# Patient Record
Sex: FEMALE | Race: WHITE | Employment: OTHER | URBAN - METROPOLITAN AREA
[De-identification: names, ages, dates, MRNs, and addresses within clinical notes are randomized per-mention and may not be internally consistent; named-entity substitution may affect disease eponyms.]

---

## 2020-01-24 ENCOUNTER — HOSPITAL ENCOUNTER (EMERGENCY)
Facility: CLINIC | Age: 80
Discharge: ANOTHER HOSPITAL | End: 2020-01-25
Attending: EMERGENCY MEDICINE
Payer: COMMERCIAL

## 2020-01-24 ENCOUNTER — APPOINTMENT (OUTPATIENT)
Dept: CT IMAGING | Facility: CLINIC | Age: 80
End: 2020-01-24
Payer: COMMERCIAL

## 2020-01-24 VITALS
OXYGEN SATURATION: 98 % | DIASTOLIC BLOOD PRESSURE: 68 MMHG | HEART RATE: 109 BPM | RESPIRATION RATE: 15 BRPM | HEIGHT: 61 IN | SYSTOLIC BLOOD PRESSURE: 147 MMHG | TEMPERATURE: 97.9 F | BODY MASS INDEX: 24.21 KG/M2 | WEIGHT: 128.25 LBS

## 2020-01-24 LAB
-: ABNORMAL
ABSOLUTE EOS #: 0 K/UL (ref 0–0.4)
ABSOLUTE IMMATURE GRANULOCYTE: ABNORMAL K/UL (ref 0–0.3)
ABSOLUTE LYMPH #: 1.91 K/UL (ref 1–4.8)
ABSOLUTE MONO #: 0.38 K/UL (ref 0.1–0.8)
AMORPHOUS: ABNORMAL
ANION GAP SERPL CALCULATED.3IONS-SCNC: 14 MMOL/L (ref 9–17)
BACTERIA: ABNORMAL
BASOPHILS # BLD: 0 % (ref 0–2)
BASOPHILS ABSOLUTE: 0 K/UL (ref 0–0.2)
BILIRUBIN URINE: NEGATIVE
BUN BLDV-MCNC: 12 MG/DL (ref 8–23)
BUN/CREAT BLD: ABNORMAL (ref 9–20)
CALCIUM SERPL-MCNC: 9.5 MG/DL (ref 8.6–10.4)
CASTS UA: ABNORMAL /LPF (ref 0–2)
CHLORIDE BLD-SCNC: 94 MMOL/L (ref 98–107)
CO2: 25 MMOL/L (ref 20–31)
COLOR: ABNORMAL
COMMENT UA: ABNORMAL
CREAT SERPL-MCNC: 0.7 MG/DL (ref 0.5–0.9)
CRYSTALS, UA: ABNORMAL /HPF
DIFFERENTIAL TYPE: ABNORMAL
EOSINOPHILS RELATIVE PERCENT: 0 % (ref 1–4)
EPITHELIAL CELLS UA: ABNORMAL /HPF (ref 0–5)
GFR AFRICAN AMERICAN: >60 ML/MIN
GFR NON-AFRICAN AMERICAN: >60 ML/MIN
GFR SERPL CREATININE-BSD FRML MDRD: ABNORMAL ML/MIN/{1.73_M2}
GFR SERPL CREATININE-BSD FRML MDRD: ABNORMAL ML/MIN/{1.73_M2}
GLUCOSE BLD-MCNC: 176 MG/DL (ref 70–99)
GLUCOSE URINE: NEGATIVE
HCT VFR BLD CALC: 33.6 % (ref 36–46)
HEMOGLOBIN: 10.9 G/DL (ref 12–16)
IMMATURE GRANULOCYTES: ABNORMAL %
KETONES, URINE: ABNORMAL
LACTIC ACID: 1.8 MMOL/L (ref 0.5–2.2)
LEUKOCYTE ESTERASE, URINE: ABNORMAL
LIPASE: 7 U/L (ref 13–60)
LYMPHOCYTES # BLD: 10 % (ref 24–44)
MCH RBC QN AUTO: 29.9 PG (ref 26–34)
MCHC RBC AUTO-ENTMCNC: 32.5 G/DL (ref 31–37)
MCV RBC AUTO: 91.9 FL (ref 80–100)
MONOCYTES # BLD: 2 % (ref 1–7)
MORPHOLOGY: NORMAL
MUCUS: ABNORMAL
NITRITE, URINE: NEGATIVE
NRBC AUTOMATED: ABNORMAL PER 100 WBC
OTHER OBSERVATIONS UA: ABNORMAL
PDW BLD-RTO: 13.9 % (ref 12.5–15.4)
PH UA: 7 (ref 5–8)
PLATELET # BLD: 272 K/UL (ref 140–450)
PLATELET ESTIMATE: ABNORMAL
PMV BLD AUTO: 6.3 FL (ref 6–12)
POTASSIUM SERPL-SCNC: 3.9 MMOL/L (ref 3.7–5.3)
PROTEIN UA: NEGATIVE
RBC # BLD: 3.66 M/UL (ref 4–5.2)
RBC # BLD: ABNORMAL 10*6/UL
RBC UA: ABNORMAL /HPF (ref 0–2)
RENAL EPITHELIAL, UA: ABNORMAL /HPF
SEG NEUTROPHILS: 88 % (ref 36–66)
SEGMENTED NEUTROPHILS ABSOLUTE COUNT: 16.81 K/UL (ref 1.8–7.7)
SODIUM BLD-SCNC: 133 MMOL/L (ref 135–144)
SPECIFIC GRAVITY UA: 1.01 (ref 1–1.03)
TRICHOMONAS: ABNORMAL
TURBIDITY: CLEAR
URINE HGB: ABNORMAL
UROBILINOGEN, URINE: NORMAL
WBC # BLD: 19.1 K/UL (ref 3.5–11)
WBC # BLD: ABNORMAL 10*3/UL
WBC UA: ABNORMAL /HPF (ref 0–5)
YEAST: ABNORMAL

## 2020-01-24 PROCEDURE — 83690 ASSAY OF LIPASE: CPT

## 2020-01-24 PROCEDURE — 80048 BASIC METABOLIC PNL TOTAL CA: CPT

## 2020-01-24 PROCEDURE — 96375 TX/PRO/DX INJ NEW DRUG ADDON: CPT

## 2020-01-24 PROCEDURE — 96374 THER/PROPH/DIAG INJ IV PUSH: CPT

## 2020-01-24 PROCEDURE — 87040 BLOOD CULTURE FOR BACTERIA: CPT

## 2020-01-24 PROCEDURE — 85025 COMPLETE CBC W/AUTO DIFF WBC: CPT

## 2020-01-24 PROCEDURE — 81001 URINALYSIS AUTO W/SCOPE: CPT

## 2020-01-24 PROCEDURE — 2580000003 HC RX 258: Performed by: EMERGENCY MEDICINE

## 2020-01-24 PROCEDURE — 36415 COLL VENOUS BLD VENIPUNCTURE: CPT

## 2020-01-24 PROCEDURE — 83605 ASSAY OF LACTIC ACID: CPT

## 2020-01-24 PROCEDURE — 99285 EMERGENCY DEPT VISIT HI MDM: CPT

## 2020-01-24 PROCEDURE — 74176 CT ABD & PELVIS W/O CONTRAST: CPT

## 2020-01-24 PROCEDURE — 87086 URINE CULTURE/COLONY COUNT: CPT

## 2020-01-24 PROCEDURE — 6360000002 HC RX W HCPCS: Performed by: EMERGENCY MEDICINE

## 2020-01-24 RX ORDER — 0.9 % SODIUM CHLORIDE 0.9 %
500 INTRAVENOUS SOLUTION INTRAVENOUS ONCE
Status: COMPLETED | OUTPATIENT
Start: 2020-01-24 | End: 2020-01-24

## 2020-01-24 RX ORDER — MORPHINE SULFATE 2 MG/ML
2 INJECTION, SOLUTION INTRAMUSCULAR; INTRAVENOUS ONCE
Status: COMPLETED | OUTPATIENT
Start: 2020-01-24 | End: 2020-01-24

## 2020-01-24 RX ADMIN — SODIUM CHLORIDE 500 ML: 9 INJECTION, SOLUTION INTRAVENOUS at 21:21

## 2020-01-24 RX ADMIN — CEFTRIAXONE SODIUM 1 G: 1 INJECTION, POWDER, FOR SOLUTION INTRAMUSCULAR; INTRAVENOUS at 22:00

## 2020-01-24 RX ADMIN — MORPHINE SULFATE 2 MG: 2 INJECTION, SOLUTION INTRAMUSCULAR; INTRAVENOUS at 21:24

## 2020-01-24 SDOH — HEALTH STABILITY: MENTAL HEALTH: HOW OFTEN DO YOU HAVE A DRINK CONTAINING ALCOHOL?: NEVER

## 2020-01-24 ASSESSMENT — PAIN DESCRIPTION - DESCRIPTORS
DESCRIPTORS: SHARP
DESCRIPTORS: SHARP

## 2020-01-24 ASSESSMENT — PAIN DESCRIPTION - LOCATION
LOCATION: ABDOMEN
LOCATION: ABDOMEN

## 2020-01-24 ASSESSMENT — PAIN DESCRIPTION - FREQUENCY
FREQUENCY: CONTINUOUS
FREQUENCY: CONTINUOUS

## 2020-01-24 ASSESSMENT — PAIN SCALES - GENERAL
PAINLEVEL_OUTOF10: 10
PAINLEVEL_OUTOF10: 8

## 2020-01-24 ASSESSMENT — PAIN DESCRIPTION - ORIENTATION
ORIENTATION: RIGHT;LEFT;LOWER;UPPER
ORIENTATION: RIGHT;LEFT;UPPER;LOWER

## 2020-01-24 ASSESSMENT — PAIN DESCRIPTION - PAIN TYPE
TYPE: ACUTE PAIN
TYPE: ACUTE PAIN

## 2020-01-25 NOTE — ED PROVIDER NOTES
MICROSCOPIC URINALYSIS - Abnormal; Notable for the following components:    Bacteria, UA FEW (*)     Mucus, UA 1+ (*)     Other Observations UA Culture ordered based on defined criteria. (*)     All other components within normal limits   URINE CULTURE CLEAN CATCH   CULTURE BLOOD #1   LACTIC ACID         EMERGENCY DEPARTMENT COURSE:   Vitals:    Vitals:    01/24/20 2025 01/24/20 2200   BP: (!) 143/69 101/75   Pulse: 121 100   Resp: 15 15   Temp: 97.9 °F (36.6 °C)    TempSrc: Oral    SpO2: 95% 95%   Weight: 58.2 kg (128 lb 4 oz)    Height: 5' 1\" (1.549 m)      -------------------------  BP: 101/75, Temp: 97.9 °F (36.6 °C), Pulse: 100, Resp: 15    Orders Placed This Encounter   Medications    0.9 % sodium chloride bolus    morphine (PF) injection 2 mg    cefTRIAXone (ROCEPHIN) 1 g in sterile water 10 mL IV syringe           Re-evaluation Notes    Labs came back with elevated white count and 19,000. Lactic acid is normal.  CAT scan per radiologist, shows nothing acute. Urine comes back with signs of urinary tract infection with leukocyte esterase, white cells. Patient had cultures taken. This time based on her findings. I feel that she needs to be admitted. She is requesting to go to [facility] as that is where she always goes and for insurance reasons. I did speak with hospitalist on-call,  was agreeable to admit        FINAL IMPRESSION      1. Urinary tract infection without hematuria, site unspecified    2. Bacteremia          DISPOSITION/PLAN   DISPOSITION Decision To Transfer 01/24/2020 10:33:20 PM      Condition on Disposition    Stable    PATIENT REFERRED TO:  No follow-up provider specified.     DISCHARGE MEDICATIONS:  New Prescriptions    No medications on file       (Please note that portions of this note were completed with a voice recognition program.  Efforts were made to edit the dictations but occasionally words are mis-transcribed.)    Roa MD, ÁNGEL MULLINS   Attending Emergency Physician        Lou Dacosta MD  01/24/20 7374

## 2020-01-25 NOTE — ED NOTES
[facility] notified of potential admission to [address] [facility]. [address], RN  01/24/[facility], [facility]  01/24/20 5805

## 2020-01-25 NOTE — ED NOTES
Pt presents to the ED via private auto accompanied by her son. Son states pt is normally alert and oriented and independent. Pt is now somewhat slower than normal and easily distracted. Pt states she developed abdominal pain this am, nausea, and discomfort when she urinates.  Pt states her bowel movements have been normal.      Asad Abreu RN  01/24/20 2053

## 2020-01-25 NOTE — ED NOTES
Dr. Jamaica Prescott returned call  For admission to [address] [place] [address]  01/24/20 4915

## 2020-01-26 LAB
CULTURE: NORMAL
Lab: NORMAL
SPECIMEN DESCRIPTION: NORMAL

## 2020-01-31 LAB
CULTURE: NORMAL
Lab: NORMAL
SPECIMEN DESCRIPTION: NORMAL

## 2021-09-06 ENCOUNTER — APPOINTMENT (OUTPATIENT)
Dept: GENERAL RADIOLOGY | Age: 81
End: 2021-09-06
Payer: COMMERCIAL

## 2021-09-06 ENCOUNTER — HOSPITAL ENCOUNTER (EMERGENCY)
Age: 81
Discharge: HOME | End: 2021-09-06
Attending: EMERGENCY MEDICINE
Payer: COMMERCIAL

## 2021-09-06 VITALS
RESPIRATION RATE: 20 BRPM | BODY MASS INDEX: 22.84 KG/M2 | TEMPERATURE: 98.3 F | WEIGHT: 121 LBS | SYSTOLIC BLOOD PRESSURE: 189 MMHG | HEART RATE: 77 BPM | DIASTOLIC BLOOD PRESSURE: 68 MMHG | HEIGHT: 61 IN | OXYGEN SATURATION: 94 %

## 2021-09-06 DIAGNOSIS — S32.010A COMPRESSION FRACTURE OF L1 VERTEBRA, INITIAL ENCOUNTER (HCC): Primary | ICD-10-CM

## 2021-09-06 PROCEDURE — 73521 X-RAY EXAM HIPS BI 2 VIEWS: CPT

## 2021-09-06 PROCEDURE — 6370000000 HC RX 637 (ALT 250 FOR IP): Performed by: NURSE PRACTITIONER

## 2021-09-06 PROCEDURE — 99283 EMERGENCY DEPT VISIT LOW MDM: CPT

## 2021-09-06 PROCEDURE — 72072 X-RAY EXAM THORAC SPINE 3VWS: CPT

## 2021-09-06 PROCEDURE — 72100 X-RAY EXAM L-S SPINE 2/3 VWS: CPT

## 2021-09-06 RX ORDER — HYDROCODONE BITARTRATE AND ACETAMINOPHEN 5; 325 MG/1; MG/1
1 TABLET ORAL EVERY 6 HOURS PRN
Qty: 20 TABLET | Refills: 0 | Status: SHIPPED | OUTPATIENT
Start: 2021-09-06 | End: 2021-09-11

## 2021-09-06 RX ORDER — HYDROCODONE BITARTRATE AND ACETAMINOPHEN 5; 325 MG/1; MG/1
1 TABLET ORAL ONCE
Status: COMPLETED | OUTPATIENT
Start: 2021-09-06 | End: 2021-09-06

## 2021-09-06 RX ADMIN — HYDROCODONE BITARTRATE AND ACETAMINOPHEN 1 TABLET: 5; 325 TABLET ORAL at 18:48

## 2021-09-06 ASSESSMENT — PAIN SCALES - GENERAL
PAINLEVEL_OUTOF10: 10
PAINLEVEL_OUTOF10: 10

## 2021-09-06 ASSESSMENT — ENCOUNTER SYMPTOMS
SORE THROAT: 0
COLOR CHANGE: 0
CONSTIPATION: 0
NAUSEA: 0
ABDOMINAL PAIN: 0
COUGH: 0
DIARRHEA: 0
RHINORRHEA: 0
SINUS PRESSURE: 0
WHEEZING: 0
SHORTNESS OF BREATH: 0
VOMITING: 0

## 2021-09-06 ASSESSMENT — PAIN DESCRIPTION - LOCATION: LOCATION: BACK;HIP

## 2021-09-06 ASSESSMENT — PAIN DESCRIPTION - PAIN TYPE: TYPE: ACUTE PAIN

## 2021-09-06 ASSESSMENT — PAIN DESCRIPTION - ORIENTATION: ORIENTATION: LEFT;RIGHT

## 2021-09-06 NOTE — ED PROVIDER NOTES
[facility] ED  eMERGENCY dEPARTMENT eNCOUnter      Pt Name: William Colunga  MRN: [id number]  [place]al[place] antonio 1940  Date of evaluation: 9/6/2021  Provider: Rossana Pena NP, AMBROCIO - Ary 3772       Chief Complaint   Patient presents with    Fall     Pt reports fall yesterday, states that she was playing with her dog when she lost her footing and fell onto her buttocks. Pt reports pain in her back and bilateral hips today    Hip Pain    Back Pain         HISTORY OF PRESENT ILLNESS  (Location/Symptom, Timing/Onset, Context/Setting, Quality, Duration, Modifying Factors, Severity.)   William Colunga is a 78 y.o. female who presents to the emergency department by private vehicle for evaluation of bilateral hip and low back pain. Patient reports that she had a mechanical trip and fall. She states that she fell landing on her buttocks and back. She is a fall happened today. She has been having pain to both of her hips and her mid and low back. The pain is a 10 on a 0-to-10 scale. The pain is worse with movement. Denies bowel or bladder incontinence retention or saddle anesthesia. She denies any presyncopal symptoms prior to falling      Nursing Notes were reviewed.     ALLERGIES     Ambien [zolpidem] and Bactrim [sulfamethoxazole-trimethoprim]    CURRENT MEDICATIONS       Discharge Medication List as of 9/6/2021  7:12 PM      CONTINUE these medications which have NOT CHANGED    Details   metoprolol tartrate (LOPRESSOR) 25 MG tablet Take 25 mg by mouth dailyHistorical Med      metFORMIN (GLUCOPHAGE) 500 MG tablet Take 500 mg by mouth 2 times daily (with meals)Historical Med      mirabegron (MYRBETRIQ) 25 MG TB24 Take 25 mg by mouth dailyHistorical Med             PAST MEDICAL HISTORY         Diagnosis Date    Diabetes mellitus ([address]     Hypertension     Tachycardia        SURGICAL HISTORY           Procedure Laterality Date    ABDOMEN SURGERY      BREAST SURGERY      HYSTERECTOMY           FAMILY HISTORY     History reviewed. No pertinent family history. No family status information on file. SOCIAL HISTORY      reports that she has never smoked. She has never used smokeless tobacco. She reports that she does not drink alcohol and does not use drugs. REVIEW OF SYSTEMS    (2-9 systems for level 4, 10 or more for level 5)     Review of Systems   Constitutional: Negative for chills, fever and unexpected weight change. HENT: Negative for congestion, rhinorrhea, sinus pressure and sore throat. Respiratory: Negative for cough, shortness of breath and wheezing. Cardiovascular: Negative for chest pain and palpitations. Gastrointestinal: Negative for abdominal pain, constipation, diarrhea, nausea and vomiting. Genitourinary: Negative for dysuria and hematuria. Musculoskeletal: Negative for arthralgias and myalgias. Skin: Negative for color change and rash. Neurological: Negative for dizziness, weakness and headaches. Hematological: Negative for adenopathy. All other systems reviewed and are negative. Except as noted above the remainder of the review of systems was reviewed and negative. PHYSICAL EXAM    (up to 7 for level 4, 8 or more for level 5)     ED Triage Vitals [09/06/21 1631]   BP Temp Temp Source Pulse Resp SpO2 Height Weight   (!) 189/68 98.3 °F (36.8 °C) Oral 77 20 94 % 5' 1\" (1.549 m) 121 lb (54.9 kg)       Physical Exam  Vitals reviewed. Constitutional:       Appearance: She is well-developed. HENT:      Head: Normocephalic and atraumatic. Eyes:      Conjunctiva/sclera: Conjunctivae normal.      Pupils: Pupils are equal, round, and reactive to light. Cardiovascular:      Rate and Rhythm: Normal rate and regular rhythm. Pulmonary:      Effort: Pulmonary effort is normal. No respiratory distress. Breath sounds: Normal breath sounds. No stridor. Abdominal:      General: Bowel sounds are normal.      Palpations: Abdomen is soft.    Musculoskeletal: VIEWS)    Result Date: 9/6/2021  EXAMINATION: THREE XRAY VIEWS OF THE THORACIC SPINE; THREE XRAY VIEWS OF THE LUMBAR SPINE 9/6/2021 2:19 pm COMPARISON: None. HISTORY: ORDERING SYSTEM PROVIDED HISTORY: Pain TECHNOLOGIST PROVIDED HISTORY: Pain Reason for Exam: back pain Acuity: Acute Type of Exam: Initial Mechanism of Injury: fell; ORDERING SYSTEM PROVIDED HISTORY: back pain TECHNOLOGIST PROVIDED HISTORY: back pain FINDINGS: No subluxations are seen. Compression fracture of L1 vertebral body with 75% loss of normal height. No significant retropulsion into the spinal canal. Mild superior endplate depression of L4 vertebral body. There is disc space narrowing with eburnation of the vertebral endplates in the midthoracic spine and also at the L1-2, L2-3, L3-4, L4-5 levels. There is sclerosis of the facet joints in the mid and lower lumbar spine. The posterior elements are otherwise intact. The paraspinal soft tissues are unremarkable except for atherosclerotic changes. Postsurgical changes overlying the mediastinum. Left pleural effusion and possible left basal infiltrate. Compression fracture of L1 vertebral body with 75% loss of normal height. Mild superior endplate depression of L4 vertebral body. Multilevel spondylosis and degenerative disc disease. Facet arthropathy Left pleural effusion and possible left basal infiltrate. XR HIP BILATERAL W AP PELVIS (2 VIEWS)    Result Date: 9/6/2021  EXAMINATION: ONE XRAY VIEW OF THE PELVIS AND TWO XRAY VIEWS OF EACH OF THE BILATERAL HIPS 9/6/2021 2:19 pm COMPARISON: None. HISTORY: ORDERING SYSTEM PROVIDED HISTORY: fall TECHNOLOGIST PROVIDED HISTORY: fall Reason for Exam: bilateral hip pain Acuity: Acute Type of Exam: Initial Mechanism of Injury: fell FINDINGS: The visualized bones are osteopenic. There is no evidence of fracture or dislocation. Mild to moderate degenerative changes of the bilateral hips. The remaining joint spaces appear well maintained.  The soft tissues are unremarkable. Vascular calcifications are seen compatible with atherosclerotic disease. No acute bony abnormalities are noted Osteoarthritis of the bilateral hips     Interpretation per the Radiologist below, if available at the time of this note:    XR LUMBAR SPINE (2-3 VIEWS)   Final Result   Compression fracture of L1 vertebral body with 75% loss of normal height. Mild superior endplate depression of L4 vertebral body. Multilevel spondylosis and degenerative disc disease. Facet arthropathy      Left pleural effusion and possible left basal infiltrate. XR HIP BILATERAL W AP PELVIS (2 VIEWS)   Final Result   No acute bony abnormalities are noted      Osteoarthritis of the bilateral hips         XR THORACIC SPINE (3 VIEWS)   Final Result   Compression fracture of L1 vertebral body with 75% loss of normal height. Mild superior endplate depression of L4 vertebral body. Multilevel spondylosis and degenerative disc disease. Facet arthropathy      Left pleural effusion and possible left basal infiltrate. LABS:  Labs Reviewed - No data to display    All other labs were within normal range or not returned as of this dictation. EMERGENCY DEPARTMENT COURSE and DIFFERENTIAL DIAGNOSIS/MDM:   Vitals:    Vitals:    09/06/21 1631   BP: (!) 189/68   Pulse: 77   Resp: 20   Temp: 98.3 °F (36.8 °C)   TempSrc: Oral   SpO2: 94%   Weight: 121 lb (54.9 kg)   Height: 5' 1\" (1.549 m)       Medical Decision Making: Patient is slow to ambulate but she is able to get around. She lives at home with her daughter. Patient was placed on Norco for pain medication and given to different spine specialist for follow-up regarding this compression fracture. FINAL IMPRESSION      1.  Compression fracture of L1 vertebra, initial encounter [facility])          DISPOSITION/PLAN   DISPOSITION Decision To Discharge 09/06/2021 07:09:52 PM      PATIENT REFERRED TO:   Mariajose Luis MD  [address] [place]    Schedule an appointment as soon as possible for a visit       Marge Sheldon MD  [address] [place] [address]  [phone]    Schedule an appointment as soon as possible for a visit         DISCHARGE MEDICATIONS:     Discharge Medication List as of 9/6/2021  7:12 PM      START taking these medications    Details   HYDROcodone-acetaminophen (NORCO) 5-325 MG per tablet Take 1 tablet by mouth every 6 hours as needed for Pain for up to 5 days. , Disp-20 tablet, R-0Print                 (Please note that portions of this note were completed with a voice recognition program.  Efforts were made to edit the dictations but occasionally words are mis-transcribed.)    Mallory Blue NP, APRN - CNP  Certified Nurse Practitioner          AMBROCIO Gentile - BECKY  09/06/21 1933

## 2021-09-06 NOTE — ED PROVIDER NOTES
eMERGENCY dEPARTMENT eNCOUnter   [address] [place] Name: Masoud Ariza  MRN: [id number]  [place] 1940  Date of evaluation: 9/6/21     Masoud Ariza is a 78 y.o. female with CC: Fall (Pt reports fall yesterday, states that she was playing with her dog when she lost her footing and fell onto her buttocks. Pt reports pain in her back and bilateral hips today), Hip Pain, and Back Pain      Based on the medical record the care appears appropriate. I was personally available for consultation in the Emergency Department.     Ino Botello DO  Attending Emergency Physician                    Lisa Hand [id number],   09/06/21 2609

## 2021-09-19 ENCOUNTER — HOSPITAL ENCOUNTER (INPATIENT)
Age: 81
LOS: 3 days | Discharge: HOME | DRG: 981 | End: 2021-09-23
Attending: STUDENT IN AN ORGANIZED HEALTH CARE EDUCATION/TRAINING PROGRAM | Admitting: FAMILY MEDICINE
Payer: COMMERCIAL

## 2021-09-19 ENCOUNTER — APPOINTMENT (OUTPATIENT)
Dept: GENERAL RADIOLOGY | Age: 81
DRG: 981 | End: 2021-09-19
Payer: COMMERCIAL

## 2021-09-19 DIAGNOSIS — J90 PLEURAL EFFUSION: ICD-10-CM

## 2021-09-19 DIAGNOSIS — M54.9 INTRACTABLE BACK PAIN: ICD-10-CM

## 2021-09-19 DIAGNOSIS — M51.36 DDD (DEGENERATIVE DISC DISEASE), LUMBAR: ICD-10-CM

## 2021-09-19 DIAGNOSIS — I50.20: Primary | ICD-10-CM

## 2021-09-19 DIAGNOSIS — N17.9 AKI (ACUTE KIDNEY INJURY) (HCC): ICD-10-CM

## 2021-09-19 LAB
-: ABNORMAL
-: NORMAL
ABSOLUTE EOS #: 0.26 K/UL (ref 0–0.44)
ABSOLUTE IMMATURE GRANULOCYTE: 0.03 K/UL (ref 0–0.3)
ABSOLUTE LYMPH #: 0.96 K/UL (ref 1.1–3.7)
ABSOLUTE MONO #: 0.69 K/UL (ref 0.1–1.2)
AMORPHOUS: ABNORMAL
ANION GAP SERPL CALCULATED.3IONS-SCNC: 15 MMOL/L (ref 9–17)
BACTERIA: ABNORMAL
BASOPHILS # BLD: 1 % (ref 0–2)
BASOPHILS ABSOLUTE: 0.05 K/UL (ref 0–0.2)
BILIRUBIN URINE: NEGATIVE
BNP INTERPRETATION: ABNORMAL
BUN BLDV-MCNC: 30 MG/DL (ref 8–23)
BUN/CREAT BLD: 19 (ref 9–20)
CALCIUM SERPL-MCNC: 9.4 MG/DL (ref 8.6–10.4)
CASTS UA: ABNORMAL /LPF
CASTS UA: ABNORMAL /LPF
CHLORIDE BLD-SCNC: 91 MMOL/L (ref 98–107)
CO2: 28 MMOL/L (ref 20–31)
COLOR: YELLOW
COMMENT UA: ABNORMAL
CREAT SERPL-MCNC: 1.54 MG/DL (ref 0.5–0.9)
CRYSTALS, UA: ABNORMAL /HPF
D-DIMER QUANTITATIVE: 2.41 MG/L FEU (ref 0–0.59)
DIFFERENTIAL TYPE: ABNORMAL
EOSINOPHILS RELATIVE PERCENT: 4 % (ref 1–4)
EPITHELIAL CELLS UA: ABNORMAL /HPF (ref 0–5)
GFR AFRICAN AMERICAN: 39 ML/MIN
GFR NON-AFRICAN AMERICAN: 32 ML/MIN
GFR SERPL CREATININE-BSD FRML MDRD: ABNORMAL ML/MIN/{1.73_M2}
GFR SERPL CREATININE-BSD FRML MDRD: ABNORMAL ML/MIN/{1.73_M2}
GLUCOSE BLD-MCNC: 170 MG/DL (ref 70–99)
GLUCOSE URINE: NEGATIVE
HCT VFR BLD CALC: 27.5 % (ref 36.3–47.1)
HEMOGLOBIN: 8.9 G/DL (ref 11.9–15.1)
IMMATURE GRANULOCYTES: 0 %
KETONES, URINE: NEGATIVE
LEUKOCYTE ESTERASE, URINE: ABNORMAL
LYMPHOCYTES # BLD: 13 % (ref 24–43)
MAGNESIUM: 1.6 MG/DL (ref 1.6–2.6)
MCH RBC QN AUTO: 30.4 PG (ref 25.2–33.5)
MCHC RBC AUTO-ENTMCNC: 32.4 G/DL (ref 28.4–34.8)
MCV RBC AUTO: 93.9 FL (ref 82.6–102.9)
MONOCYTES # BLD: 9 % (ref 3–12)
MUCUS: ABNORMAL
NITRITE, URINE: NEGATIVE
NRBC AUTOMATED: 0 PER 100 WBC
OTHER OBSERVATIONS UA: ABNORMAL
PDW BLD-RTO: 13 % (ref 11.8–14.4)
PH UA: 7 (ref 5–8)
PLATELET # BLD: 292 K/UL (ref 138–453)
PLATELET ESTIMATE: ABNORMAL
PMV BLD AUTO: 8.5 FL (ref 8.1–13.5)
POTASSIUM SERPL-SCNC: 4.4 MMOL/L (ref 3.7–5.3)
PRO-BNP: 2037 PG/ML
PROTEIN UA: NEGATIVE
RBC # BLD: 2.93 M/UL (ref 3.95–5.11)
RBC # BLD: ABNORMAL 10*6/UL
RBC UA: ABNORMAL /HPF (ref 0–2)
REASON FOR REJECTION: NORMAL
RENAL EPITHELIAL, UA: ABNORMAL /HPF
SEG NEUTROPHILS: 73 % (ref 36–65)
SEGMENTED NEUTROPHILS ABSOLUTE COUNT: 5.51 K/UL (ref 1.5–8.1)
SODIUM BLD-SCNC: 134 MMOL/L (ref 135–144)
SPECIFIC GRAVITY UA: 1.01 (ref 1–1.03)
TRICHOMONAS: ABNORMAL
TROPONIN INTERP: ABNORMAL
TROPONIN T: ABNORMAL NG/ML
TROPONIN, HIGH SENSITIVITY: 19 NG/L (ref 0–14)
TURBIDITY: ABNORMAL
URINE HGB: ABNORMAL
UROBILINOGEN, URINE: NORMAL
WBC # BLD: 7.5 K/UL (ref 3.5–11.3)
WBC # BLD: ABNORMAL 10*3/UL
WBC UA: ABNORMAL /HPF (ref 0–5)
YEAST: ABNORMAL
ZZ NTE CLEAN UP: ORDERED TEST: NORMAL
ZZ NTE WITH NAME CLEAN UP: SPECIMEN SOURCE: NORMAL

## 2021-09-19 PROCEDURE — 85610 PROTHROMBIN TIME: CPT

## 2021-09-19 PROCEDURE — 83880 ASSAY OF NATRIURETIC PEPTIDE: CPT

## 2021-09-19 PROCEDURE — 81001 URINALYSIS AUTO W/SCOPE: CPT

## 2021-09-19 PROCEDURE — 87086 URINE CULTURE/COLONY COUNT: CPT

## 2021-09-19 PROCEDURE — 85379 FIBRIN DEGRADATION QUANT: CPT

## 2021-09-19 PROCEDURE — 84484 ASSAY OF TROPONIN QUANT: CPT

## 2021-09-19 PROCEDURE — 99285 EMERGENCY DEPT VISIT HI MDM: CPT

## 2021-09-19 PROCEDURE — 83735 ASSAY OF MAGNESIUM: CPT

## 2021-09-19 PROCEDURE — 85025 COMPLETE CBC W/AUTO DIFF WBC: CPT

## 2021-09-19 PROCEDURE — 36415 COLL VENOUS BLD VENIPUNCTURE: CPT

## 2021-09-19 PROCEDURE — 71045 X-RAY EXAM CHEST 1 VIEW: CPT

## 2021-09-19 PROCEDURE — 80048 BASIC METABOLIC PNL TOTAL CA: CPT

## 2021-09-19 PROCEDURE — 93005 ELECTROCARDIOGRAM TRACING: CPT | Performed by: STUDENT IN AN ORGANIZED HEALTH CARE EDUCATION/TRAINING PROGRAM

## 2021-09-19 RX ORDER — POTASSIUM CHLORIDE 20 MEQ/1
20 TABLET, EXTENDED RELEASE ORAL DAILY
COMMUNITY

## 2021-09-19 RX ORDER — AMIODARONE HYDROCHLORIDE 200 MG/1
200 TABLET ORAL DAILY
COMMUNITY
Start: 2021-08-29

## 2021-09-19 RX ORDER — WARFARIN SODIUM 2 MG/1
2 TABLET ORAL DAILY
COMMUNITY
Start: 2021-09-16

## 2021-09-19 RX ORDER — FERROUS SULFATE 325(65) MG
325 TABLET ORAL
COMMUNITY
Start: 2021-08-17

## 2021-09-19 RX ORDER — LEVOTHYROXINE SODIUM 0.03 MG/1
25 TABLET ORAL DAILY
COMMUNITY

## 2021-09-19 RX ORDER — ASPIRIN 81 MG/1
81 TABLET ORAL DAILY
COMMUNITY
Start: 2021-08-17

## 2021-09-19 RX ORDER — TIMOLOL MALEATE 5 MG/ML
1 SOLUTION/ DROPS OPHTHALMIC 2 TIMES DAILY
COMMUNITY

## 2021-09-19 RX ORDER — TORSEMIDE 20 MG/1
TABLET ORAL
Status: ON HOLD | COMMUNITY
Start: 2021-07-24 | End: 2021-09-23 | Stop reason: HOSPADM

## 2021-09-19 RX ORDER — CARVEDILOL 6.25 MG/1
6.25 TABLET ORAL 2 TIMES DAILY WITH MEALS
COMMUNITY
Start: 2021-07-29

## 2021-09-19 ASSESSMENT — PAIN SCALES - GENERAL: PAINLEVEL_OUTOF10: 10

## 2021-09-20 PROBLEM — M80.88XA PATHOLOGICAL FRACTURE OF LUMBAR VERTEBRA DUE TO SECONDARY OSTEOPOROSIS (HCC): Status: ACTIVE | Noted: 2021-09-20

## 2021-09-20 PROBLEM — M51.36 DDD (DEGENERATIVE DISC DISEASE), LUMBAR: Status: ACTIVE | Noted: 2021-09-20

## 2021-09-20 PROBLEM — I50.33 ACUTE ON CHRONIC DIASTOLIC CHF (CONGESTIVE HEART FAILURE) (HCC): Status: ACTIVE | Noted: 2021-09-20

## 2021-09-20 PROBLEM — M80.00XA AGE-RELATED OSTEOPOROSIS WITH CURRENT PATHOLOGICAL FRACTURE: Status: ACTIVE | Noted: 2021-09-20

## 2021-09-20 PROBLEM — M16.0 BILATERAL PRIMARY OSTEOARTHRITIS OF HIP: Status: ACTIVE | Noted: 2021-09-20

## 2021-09-20 PROBLEM — M54.9 INTRACTABLE BACK PAIN: Status: ACTIVE | Noted: 2021-09-20

## 2021-09-20 LAB
ABSOLUTE EOS #: 0.27 K/UL (ref 0–0.44)
ABSOLUTE IMMATURE GRANULOCYTE: 0.03 K/UL (ref 0–0.3)
ABSOLUTE LYMPH #: 1.16 K/UL (ref 1.1–3.7)
ABSOLUTE MONO #: 0.68 K/UL (ref 0.1–1.2)
ANION GAP SERPL CALCULATED.3IONS-SCNC: 10 MMOL/L (ref 9–17)
BASOPHILS # BLD: 1 % (ref 0–2)
BASOPHILS ABSOLUTE: 0.06 K/UL (ref 0–0.2)
BNP INTERPRETATION: ABNORMAL
BUN BLDV-MCNC: 28 MG/DL (ref 8–23)
BUN/CREAT BLD: 19 (ref 9–20)
CALCIUM SERPL-MCNC: 9.3 MG/DL (ref 8.6–10.4)
CHLORIDE BLD-SCNC: 92 MMOL/L (ref 98–107)
CO2: 33 MMOL/L (ref 20–31)
CREAT SERPL-MCNC: 1.5 MG/DL (ref 0.5–0.9)
DIFFERENTIAL TYPE: ABNORMAL
EKG ATRIAL RATE: 78 BPM
EKG P AXIS: 57 DEGREES
EKG P-R INTERVAL: 152 MS
EKG Q-T INTERVAL: 402 MS
EKG QRS DURATION: 104 MS
EKG QTC CALCULATION (BAZETT): 458 MS
EKG T AXIS: 37 DEGREES
EKG VENTRICULAR RATE: 78 BPM
EOSINOPHILS RELATIVE PERCENT: 4 % (ref 1–4)
GFR AFRICAN AMERICAN: 40 ML/MIN
GFR NON-AFRICAN AMERICAN: 33 ML/MIN
GFR SERPL CREATININE-BSD FRML MDRD: ABNORMAL ML/MIN/{1.73_M2}
GFR SERPL CREATININE-BSD FRML MDRD: ABNORMAL ML/MIN/{1.73_M2}
GLUCOSE BLD-MCNC: 114 MG/DL (ref 65–105)
GLUCOSE BLD-MCNC: 114 MG/DL (ref 65–105)
GLUCOSE BLD-MCNC: 142 MG/DL (ref 65–105)
GLUCOSE BLD-MCNC: 147 MG/DL (ref 65–105)
GLUCOSE BLD-MCNC: 99 MG/DL (ref 70–99)
HCT VFR BLD CALC: 25.9 % (ref 36.3–47.1)
HEMOGLOBIN: 8.5 G/DL (ref 11.9–15.1)
IMMATURE GRANULOCYTES: 1 %
INR BLD: 1.5
INR BLD: 1.7
INR BLD: 1.9
INR BLD: 2
IRON SATURATION: 11 % (ref 20–55)
IRON: 23 UG/DL (ref 37–145)
LYMPHOCYTES # BLD: 18 % (ref 24–43)
MCH RBC QN AUTO: 30.7 PG (ref 25.2–33.5)
MCHC RBC AUTO-ENTMCNC: 32.8 G/DL (ref 28.4–34.8)
MCV RBC AUTO: 93.5 FL (ref 82.6–102.9)
MONOCYTES # BLD: 10 % (ref 3–12)
NRBC AUTOMATED: 0 PER 100 WBC
PDW BLD-RTO: 12.8 % (ref 11.8–14.4)
PLATELET # BLD: 282 K/UL (ref 138–453)
PLATELET ESTIMATE: ABNORMAL
PMV BLD AUTO: 8.4 FL (ref 8.1–13.5)
POTASSIUM SERPL-SCNC: 3.9 MMOL/L (ref 3.7–5.3)
PRO-BNP: 2965 PG/ML
PROTHROMBIN TIME: 17.9 SEC (ref 11.5–14.2)
PROTHROMBIN TIME: 19.3 SEC (ref 11.5–14.2)
PROTHROMBIN TIME: 21.5 SEC (ref 11.5–14.2)
PROTHROMBIN TIME: 22.2 SEC (ref 11.5–14.2)
RBC # BLD: 2.77 M/UL (ref 3.95–5.11)
RBC # BLD: ABNORMAL 10*6/UL
SARS-COV-2, RAPID: NOT DETECTED
SEG NEUTROPHILS: 66 % (ref 36–65)
SEGMENTED NEUTROPHILS ABSOLUTE COUNT: 4.42 K/UL (ref 1.5–8.1)
SODIUM BLD-SCNC: 135 MMOL/L (ref 135–144)
SPECIMEN DESCRIPTION: NORMAL
THYROXINE, FREE: 1.33 NG/DL (ref 0.93–1.7)
TOTAL IRON BINDING CAPACITY: 208 UG/DL (ref 250–450)
TROPONIN INTERP: ABNORMAL
TROPONIN T: ABNORMAL NG/ML
TROPONIN, HIGH SENSITIVITY: 21 NG/L (ref 0–14)
TSH SERPL DL<=0.05 MIU/L-ACNC: 6.82 MIU/L (ref 0.3–5)
UNSATURATED IRON BINDING CAPACITY: 185 UG/DL (ref 112–347)
VITAMIN D 25-HYDROXY: 21.8 NG/ML (ref 30–100)
WBC # BLD: 6.6 K/UL (ref 3.5–11.3)
WBC # BLD: ABNORMAL 10*3/UL

## 2021-09-20 PROCEDURE — 83540 ASSAY OF IRON: CPT

## 2021-09-20 PROCEDURE — 82947 ASSAY GLUCOSE BLOOD QUANT: CPT

## 2021-09-20 PROCEDURE — 84439 ASSAY OF FREE THYROXINE: CPT

## 2021-09-20 PROCEDURE — 6360000002 HC RX W HCPCS: Performed by: FAMILY MEDICINE

## 2021-09-20 PROCEDURE — 36415 COLL VENOUS BLD VENIPUNCTURE: CPT

## 2021-09-20 PROCEDURE — 6370000000 HC RX 637 (ALT 250 FOR IP): Performed by: FAMILY MEDICINE

## 2021-09-20 PROCEDURE — 85025 COMPLETE CBC W/AUTO DIFF WBC: CPT

## 2021-09-20 PROCEDURE — 1200000000 HC SEMI PRIVATE

## 2021-09-20 PROCEDURE — 2580000003 HC RX 258: Performed by: NURSE PRACTITIONER

## 2021-09-20 PROCEDURE — 87635 SARS-COV-2 COVID-19 AMP PRB: CPT

## 2021-09-20 PROCEDURE — 2580000003 HC RX 258: Performed by: FAMILY MEDICINE

## 2021-09-20 PROCEDURE — 6360000002 HC RX W HCPCS: Performed by: STUDENT IN AN ORGANIZED HEALTH CARE EDUCATION/TRAINING PROGRAM

## 2021-09-20 PROCEDURE — 85610 PROTHROMBIN TIME: CPT

## 2021-09-20 PROCEDURE — 6360000002 HC RX W HCPCS: Performed by: NURSE PRACTITIONER

## 2021-09-20 PROCEDURE — 82306 VITAMIN D 25 HYDROXY: CPT

## 2021-09-20 PROCEDURE — 83550 IRON BINDING TEST: CPT

## 2021-09-20 PROCEDURE — 80048 BASIC METABOLIC PNL TOTAL CA: CPT

## 2021-09-20 PROCEDURE — 6370000000 HC RX 637 (ALT 250 FOR IP): Performed by: STUDENT IN AN ORGANIZED HEALTH CARE EDUCATION/TRAINING PROGRAM

## 2021-09-20 PROCEDURE — 84443 ASSAY THYROID STIM HORMONE: CPT

## 2021-09-20 PROCEDURE — 83880 ASSAY OF NATRIURETIC PEPTIDE: CPT

## 2021-09-20 RX ORDER — SODIUM CHLORIDE 0.9 % (FLUSH) 0.9 %
5-40 SYRINGE (ML) INJECTION PRN
Status: DISCONTINUED | OUTPATIENT
Start: 2021-09-20 | End: 2021-09-21 | Stop reason: SDUPTHER

## 2021-09-20 RX ORDER — ACETAMINOPHEN 325 MG/1
650 TABLET ORAL EVERY 4 HOURS PRN
Status: DISCONTINUED | OUTPATIENT
Start: 2021-09-20 | End: 2021-09-21 | Stop reason: SDUPTHER

## 2021-09-20 RX ORDER — SODIUM CHLORIDE 9 MG/ML
25 INJECTION, SOLUTION INTRAVENOUS PRN
Status: DISCONTINUED | OUTPATIENT
Start: 2021-09-20 | End: 2021-09-21 | Stop reason: SDUPTHER

## 2021-09-20 RX ORDER — ALPRAZOLAM 0.25 MG/1
0.25 TABLET ORAL 3 TIMES DAILY PRN
Status: DISCONTINUED | OUTPATIENT
Start: 2021-09-20 | End: 2021-09-23 | Stop reason: HOSPADM

## 2021-09-20 RX ORDER — SODIUM CHLORIDE 0.9 % (FLUSH) 0.9 %
5-40 SYRINGE (ML) INJECTION EVERY 12 HOURS SCHEDULED
Status: DISCONTINUED | OUTPATIENT
Start: 2021-09-20 | End: 2021-09-21 | Stop reason: SDUPTHER

## 2021-09-20 RX ORDER — CARVEDILOL 6.25 MG/1
6.25 TABLET ORAL 2 TIMES DAILY WITH MEALS
Status: DISCONTINUED | OUTPATIENT
Start: 2021-09-20 | End: 2021-09-23 | Stop reason: HOSPADM

## 2021-09-20 RX ORDER — DEXTROSE MONOHYDRATE 50 MG/ML
100 INJECTION, SOLUTION INTRAVENOUS PRN
Status: DISCONTINUED | OUTPATIENT
Start: 2021-09-20 | End: 2021-09-23 | Stop reason: HOSPADM

## 2021-09-20 RX ORDER — TORSEMIDE 20 MG/1
20 TABLET ORAL
Status: DISCONTINUED | OUTPATIENT
Start: 2021-09-20 | End: 2021-09-20

## 2021-09-20 RX ORDER — TORSEMIDE 20 MG/1
40 TABLET ORAL DAILY
Status: DISCONTINUED | OUTPATIENT
Start: 2021-09-21 | End: 2021-09-23 | Stop reason: HOSPADM

## 2021-09-20 RX ORDER — FUROSEMIDE 10 MG/ML
20 INJECTION INTRAMUSCULAR; INTRAVENOUS DAILY
Status: DISCONTINUED | OUTPATIENT
Start: 2021-09-20 | End: 2021-09-20

## 2021-09-20 RX ORDER — WARFARIN SODIUM 2 MG/1
2 TABLET ORAL DAILY
Status: DISCONTINUED | OUTPATIENT
Start: 2021-09-20 | End: 2021-09-20

## 2021-09-20 RX ORDER — POTASSIUM CHLORIDE 20 MEQ/1
20 TABLET, EXTENDED RELEASE ORAL
Status: DISCONTINUED | OUTPATIENT
Start: 2021-09-20 | End: 2021-09-23 | Stop reason: HOSPADM

## 2021-09-20 RX ORDER — AMIODARONE HYDROCHLORIDE 200 MG/1
200 TABLET ORAL DAILY
Status: DISCONTINUED | OUTPATIENT
Start: 2021-09-20 | End: 2021-09-20

## 2021-09-20 RX ORDER — TORSEMIDE 20 MG/1
20 TABLET ORAL
Status: DISCONTINUED | OUTPATIENT
Start: 2021-09-21 | End: 2021-09-20

## 2021-09-20 RX ORDER — DEXTROSE MONOHYDRATE 25 G/50ML
12.5 INJECTION, SOLUTION INTRAVENOUS PRN
Status: DISCONTINUED | OUTPATIENT
Start: 2021-09-20 | End: 2021-09-23 | Stop reason: HOSPADM

## 2021-09-20 RX ORDER — AMIODARONE HYDROCHLORIDE 200 MG/1
100 TABLET ORAL DAILY
Status: DISCONTINUED | OUTPATIENT
Start: 2021-09-21 | End: 2021-09-21 | Stop reason: SDUPTHER

## 2021-09-20 RX ORDER — ASPIRIN 81 MG/1
81 TABLET, CHEWABLE ORAL DAILY
Status: DISCONTINUED | OUTPATIENT
Start: 2021-09-20 | End: 2021-09-23 | Stop reason: HOSPADM

## 2021-09-20 RX ORDER — ACETAMINOPHEN 500 MG
1000 TABLET ORAL ONCE
Status: COMPLETED | OUTPATIENT
Start: 2021-09-20 | End: 2021-09-20

## 2021-09-20 RX ORDER — LANOLIN ALCOHOL/MO/W.PET/CERES
325 CREAM (GRAM) TOPICAL
Status: DISCONTINUED | OUTPATIENT
Start: 2021-09-20 | End: 2021-09-23 | Stop reason: HOSPADM

## 2021-09-20 RX ORDER — FUROSEMIDE 10 MG/ML
20 INJECTION INTRAMUSCULAR; INTRAVENOUS ONCE
Status: COMPLETED | OUTPATIENT
Start: 2021-09-20 | End: 2021-09-20

## 2021-09-20 RX ORDER — LEVOTHYROXINE SODIUM 0.03 MG/1
25 TABLET ORAL DAILY
Status: DISCONTINUED | OUTPATIENT
Start: 2021-09-20 | End: 2021-09-21 | Stop reason: SDUPTHER

## 2021-09-20 RX ORDER — ONDANSETRON 2 MG/ML
4 INJECTION INTRAMUSCULAR; INTRAVENOUS EVERY 6 HOURS PRN
Status: DISCONTINUED | OUTPATIENT
Start: 2021-09-20 | End: 2021-09-21 | Stop reason: SDUPTHER

## 2021-09-20 RX ORDER — NICOTINE POLACRILEX 4 MG
15 LOZENGE BUCCAL PRN
Status: DISCONTINUED | OUTPATIENT
Start: 2021-09-20 | End: 2021-09-23 | Stop reason: HOSPADM

## 2021-09-20 RX ADMIN — CARVEDILOL 6.25 MG: 6.25 TABLET, FILM COATED ORAL at 18:05

## 2021-09-20 RX ADMIN — FERROUS SULFATE TAB EC 325 MG (65 MG FE EQUIVALENT) 325 MG: 325 (65 FE) TABLET DELAYED RESPONSE at 09:05

## 2021-09-20 RX ADMIN — INSULIN LISPRO 2 UNITS: 100 INJECTION, SOLUTION INTRAVENOUS; SUBCUTANEOUS at 18:05

## 2021-09-20 RX ADMIN — ASPIRIN 81 MG: 81 TABLET, CHEWABLE ORAL at 09:05

## 2021-09-20 RX ADMIN — CARVEDILOL 6.25 MG: 6.25 TABLET, FILM COATED ORAL at 09:05

## 2021-09-20 RX ADMIN — SODIUM CHLORIDE, PRESERVATIVE FREE 10 ML: 5 INJECTION INTRAVENOUS at 09:06

## 2021-09-20 RX ADMIN — ACETAMINOPHEN 650 MG: 325 TABLET ORAL at 05:13

## 2021-09-20 RX ADMIN — SODIUM CHLORIDE, PRESERVATIVE FREE 10 ML: 5 INJECTION INTRAVENOUS at 21:47

## 2021-09-20 RX ADMIN — ACETAMINOPHEN 1000 MG: 500 TABLET ORAL at 00:37

## 2021-09-20 RX ADMIN — INSULIN LISPRO 2 UNITS: 100 INJECTION, SOLUTION INTRAVENOUS; SUBCUTANEOUS at 13:04

## 2021-09-20 RX ADMIN — LEVOTHYROXINE SODIUM 25 MCG: 25 TABLET ORAL at 07:21

## 2021-09-20 RX ADMIN — FUROSEMIDE 20 MG: 10 INJECTION, SOLUTION INTRAMUSCULAR; INTRAVENOUS at 02:03

## 2021-09-20 RX ADMIN — AMIODARONE HYDROCHLORIDE 200 MG: 200 TABLET ORAL at 09:05

## 2021-09-20 RX ADMIN — ACETAMINOPHEN 650 MG: 325 TABLET ORAL at 14:00

## 2021-09-20 RX ADMIN — MAGNESIUM OXIDE TAB 400 MG (241.3 MG ELEMENTAL MG) 400 MG: 400 (241.3 MG) TAB at 09:05

## 2021-09-20 RX ADMIN — POTASSIUM CHLORIDE 20 MEQ: 20 TABLET, EXTENDED RELEASE ORAL at 09:05

## 2021-09-20 RX ADMIN — ALPRAZOLAM 0.25 MG: 0.25 TABLET ORAL at 23:54

## 2021-09-20 RX ADMIN — FUROSEMIDE 20 MG: 10 INJECTION, SOLUTION INTRAMUSCULAR; INTRAVENOUS at 09:06

## 2021-09-20 RX ADMIN — ACETAMINOPHEN 650 MG: 325 TABLET ORAL at 09:18

## 2021-09-20 RX ADMIN — PHYTONADIONE 1 MG: 10 INJECTION, EMULSION INTRAMUSCULAR; INTRAVENOUS; SUBCUTANEOUS at 13:04

## 2021-09-20 RX ADMIN — ACETAMINOPHEN 650 MG: 325 TABLET ORAL at 18:10

## 2021-09-20 ASSESSMENT — PAIN SCALES - GENERAL
PAINLEVEL_OUTOF10: 3
PAINLEVEL_OUTOF10: 10
PAINLEVEL_OUTOF10: 6
PAINLEVEL_OUTOF10: 10
PAINLEVEL_OUTOF10: 3
PAINLEVEL_OUTOF10: 1
PAINLEVEL_OUTOF10: 10
PAINLEVEL_OUTOF10: 0
PAINLEVEL_OUTOF10: 10
PAINLEVEL_OUTOF10: 1
PAINLEVEL_OUTOF10: 3

## 2021-09-20 ASSESSMENT — PAIN DESCRIPTION - LOCATION
LOCATION: BACK

## 2021-09-20 ASSESSMENT — PAIN DESCRIPTION - ONSET
ONSET: ON-GOING

## 2021-09-20 ASSESSMENT — PAIN DESCRIPTION - PAIN TYPE
TYPE: CHRONIC PAIN
TYPE: ACUTE PAIN;CHRONIC PAIN
TYPE: ACUTE PAIN
TYPE: CHRONIC PAIN
TYPE: CHRONIC PAIN
TYPE: ACUTE PAIN;CHRONIC PAIN
TYPE: CHRONIC PAIN
TYPE: CHRONIC PAIN;ACUTE PAIN

## 2021-09-20 ASSESSMENT — PAIN DESCRIPTION - DESCRIPTORS
DESCRIPTORS: CONSTANT;DISCOMFORT
DESCRIPTORS: DISCOMFORT
DESCRIPTORS: CONSTANT
DESCRIPTORS: DISCOMFORT
DESCRIPTORS: ACHING;CONSTANT
DESCRIPTORS: CONSTANT;DISCOMFORT

## 2021-09-20 ASSESSMENT — PAIN DESCRIPTION - ORIENTATION
ORIENTATION: MID
ORIENTATION: MID;LOWER;UPPER

## 2021-09-20 ASSESSMENT — PAIN DESCRIPTION - FREQUENCY
FREQUENCY: CONTINUOUS

## 2021-09-20 ASSESSMENT — PAIN DESCRIPTION - PROGRESSION
CLINICAL_PROGRESSION: NOT CHANGED
CLINICAL_PROGRESSION: GRADUALLY WORSENING

## 2021-09-20 ASSESSMENT — ENCOUNTER SYMPTOMS
SHORTNESS OF BREATH: 1
EYE DISCHARGE: 0
EYE REDNESS: 0
BACK PAIN: 0
ABDOMINAL PAIN: 0

## 2021-09-20 NOTE — ED PROVIDER NOTES
[facility] ED  Emergency Department Encounter     Pt Name: Lizzeth Servin  MRN: [id number]  Armsvincegfmarco antonio 1940  Date of evaluation: 9/20/21  PCP:  Adam Conklin MD    CHIEF COMPLAINT       Chest pain    HISTORY OFPRESENT ILLNESS  (Location/Symptom, Timing/Onset, Context/Setting, Quality, Duration, Modifying Gwenith Oppenheim.)      Lizzeth Servin is a 78 y.o. female who presents with episode of chest pain. Patient has significant history of open heart surgery as well as chronic congestive heart failure on warfarin. States she had episode of chest pain lasting approximately 10 to 20 minutes while seated watching TV. Transported via EMS who gave nitro as well as aspirin. States pain is largely resolved. Chest pain was intermittent. Sharp. Substernal.  Relieved with nitro and aspirin did have a recent fall few weeks prior leading to L1 compression fracture. She states that this pain is worsening however that her chest pain has much improved. Denying significant shortness of breath. Denies any weight gain however has noticed that her bilateral lower extremities have been more swollen. PAST MEDICAL / SURGICAL / SOCIAL / FAMILY HISTORY      has a past medical history of Diabetes mellitus ([address] Hypertension, and Tachycardia. has a past surgical history that includes Hysterectomy; Abdomen surgery; and Breast surgery.     Social History     Socioeconomic History    Marital status:      Spouse name: Not on file    Number of children: Not on file    Years of education: Not on file    Highest education level: Not on file   Occupational History    Not on file   Tobacco Use    Smoking status: Never Smoker    Smokeless tobacco: Never Used   Substance and Sexual Activity    Alcohol use: Never    Drug use: Never    Sexual activity: Not Currently   Other Topics Concern    Not on file   Social History Narrative    Not on file     Social Determinants of Health     Financial Resource Strain:  Difficulty of Paying Living Expenses:    Food Insecurity:     Worried About Running Out of Food in the Last Year:     Ran Out of Food in the Last Year:    Transportation Needs:     Lack of Transportation (Medical):  Lack of Transportation (Non-Medical):    Physical Activity:     Days of Exercise per Week:     Minutes of Exercise per Session:    Stress:     Feeling of Stress :    Social Connections:     Frequency of Communication with Friends and Family:     Frequency of Social Gatherings with Friends and Family:     Attends Church Services:     Active Member of Clubs or Organizations:     Attends Club or Organization Meetings:     Marital Status:    Intimate Partner Violence:     Fear of Current or Ex-Partner:     Emotionally Abused:     Physically Abused:     Sexually Abused:        History reviewed. No pertinent family history. Allergies:  Ambien [zolpidem] and Bactrim [sulfamethoxazole-trimethoprim]    Home Medications:  Prior to Admission medications    Medication Sig Start Date End Date Taking?  Authorizing Provider   levothyroxine (SYNTHROID) 25 MCG tablet Take 25 mcg by mouth daily   Yes Historical Provider, MD   MAGNESIUM PO Take 400 mg by mouth daily   Yes Historical Provider, MD   timolol (TIMOPTIC) 0.5 % ophthalmic solution timolol maleate 0.5 % eye drops   Yes Historical Provider, MD   torsemide (DEMADEX) 20 MG tablet 1 tablet daily in the afternoon another tablet every other day 7/24/21  Yes Historical Provider, MD   warfarin (COUMADIN) 2 MG tablet TAKE 1 TABLET BY MOUTH EVERY DAY 9/16/21  Yes Historical Provider, MD   amiodarone (CORDARONE) 200 MG tablet TAKE 1 TABLET BY MOUTH EVERY DAY 8/29/21  Yes Historical Provider, MD   aspirin 81 MG EC tablet Take 81 mg by mouth 8/17/21  Yes Historical Provider, MD   carvedilol (COREG) 6.25 MG tablet TAKE 1 TABLET BY MOUTH TWICE A DAY WITH FOOD 7/29/21  Yes Historical Provider, MD   ferrous sulfate (IRON 325) 325 (65 Fe) MG tablet Take 325 mg by mouth 8/17/21  Yes Historical Provider, MD   potassium chloride (KLOR-CON M) 20 MEQ extended release tablet Take 20 mEq by mouth daily   Yes Historical Provider, MD   metFORMIN (GLUCOPHAGE) 500 MG tablet Take 500 mg by mouth 2 times daily (with meals)   Yes Historical Provider, MD   metoprolol tartrate (LOPRESSOR) 25 MG tablet Take 25 mg by mouth daily    Historical Provider, MD   mirabegron (MYRBETRIQ) 25 MG TB24 Take 25 mg by mouth daily    Historical Provider, MD       REVIEW OF SYSTEMS    (2-9 systems for level 4, 10 or more for level 5)      Review of Systems   Constitutional: Negative for chills and fever. Eyes: Negative for discharge and redness. Respiratory: Positive for shortness of breath. Cardiovascular: Positive for chest pain and leg swelling. Gastrointestinal: Negative for abdominal pain. Genitourinary: Negative for flank pain. Musculoskeletal: Negative for back pain. Skin: Negative for rash. Allergic/Immunologic: Positive for environmental allergies. Neurological: Negative for headaches. Psychiatric/Behavioral: Negative for agitation and confusion. PHYSICAL EXAM   (up to 7 for level 4, 8 or more for level 5)     INITIAL VITALS:    height is 5' 1\" (1.549 m) and weight is 120 lb (54.4 kg). Her oral temperature is 97.9 °F (36.6 °C). Her blood pressure is 144/53 (abnormal) and her pulse is 71. Her respiration is 18 and oxygen saturation is 96%. Physical Exam  Vitals and nursing note reviewed. Constitutional:       Appearance: She is well-developed. HENT:      Head: Normocephalic and atraumatic. Nose: Nose normal.      Mouth/Throat:      Mouth: Mucous membranes are moist.   Eyes:      General: No scleral icterus. Conjunctiva/sclera: Conjunctivae normal.      Pupils: Pupils are equal, round, and reactive to light. Neck:      Trachea: No tracheal deviation. Cardiovascular:      Rate and Rhythm: Normal rate and regular rhythm.       Heart sounds: Normal heart sounds. No murmur heard. No friction rub. No gallop. Pulmonary:      Effort: Pulmonary effort is normal. No respiratory distress. Breath sounds: Normal breath sounds. No wheezing or rales. Abdominal:      General: There is no distension. Palpations: Abdomen is soft. There is no mass. Tenderness: There is no abdominal tenderness. There is no guarding. Hernia: No hernia is present. Musculoskeletal:         General: Normal range of motion. Cervical back: Neck supple. Right lower leg: Edema present. Left lower leg: Edema present. Comments: 2+ pitting edema to level of knee bilaterally   Skin:     General: Skin is warm and dry. Findings: No erythema or rash. Neurological:      Mental Status: She is alert and oriented to person, place, and time. Psychiatric:         Behavior: Behavior normal.         DIFFERENTIAL  DIAGNOSIS     PLAN (LABS / IMAGING / EKG):  Orders Placed This Encounter   Procedures    Culture, Urine    XR CHEST PORTABLE    Basic Metabolic Panel    Brain Natriuretic Peptide    Magnesium    Troponin    Urinalysis Reflex to Culture    SPECIMEN REJECTION    CBC Auto Differential    D-Dimer, Quantitative    Troponin    Microscopic Urinalysis    Protime-INR    ADULT DIET;  Regular    Vital signs per unit routine    Notify physician    Notify physician    Up with assistance    Telemetry monitoring - continuous duration    Full Code    Inpatient consult to Cardiology    Inpatient consult to Orthopedic Surgery    EKG 12 Lead    Insert peripheral IV    PATIENT STATUS (FROM ED OR OR/PROCEDURAL) Inpatient       MEDICATIONS ORDERED:  Orders Placed This Encounter   Medications    acetaminophen (TYLENOL) tablet 1,000 mg    furosemide (LASIX) injection 20 mg    sodium chloride flush 0.9 % injection 5-40 mL    sodium chloride flush 0.9 % injection 5-40 mL    0.9 % sodium chloride infusion    acetaminophen (TYLENOL) tablet 650 mg    ondansetron (ZOFRAN) injection 4 mg       DDX: ACS versus DVT versus CHF    Initial MDM/Plan: 78 y.o. female who presents with episode of chest pain. Chest pain is largely resolved however given patient's significant history will get ACS work-up as well as CHF work-up. Possible admission.     DIAGNOSTIC RESULTS / EMERGENCY DEPARTMENT COURSE / MDM     LABS:  Labs Reviewed   BASIC METABOLIC PANEL - Abnormal; Notable for the following components:       Result Value    Glucose 170 (*)     BUN 30 (*)     CREATININE 1.54 (*)     Sodium 134 (*)     Chloride 91 (*)     GFR Non- 32 (*)     GFR  39 (*)     All other components within normal limits   BRAIN NATRIURETIC PEPTIDE - Abnormal; Notable for the following components:    Pro-BNP 2,037 (*)     All other components within normal limits   TROPONIN - Abnormal; Notable for the following components:    Troponin, High Sensitivity 19 (*)     All other components within normal limits   URINE RT REFLEX TO CULTURE - Abnormal; Notable for the following components:    Turbidity UA SLIGHTLY CLOUDY (*)     Urine Hgb TRACE (*)     Leukocyte Esterase, Urine TRACE (*)     All other components within normal limits   CBC WITH AUTO DIFFERENTIAL - Abnormal; Notable for the following components:    RBC 2.93 (*)     Hemoglobin 8.9 (*)     Hematocrit 27.5 (*)     Seg Neutrophils 73 (*)     Lymphocytes 13 (*)     Absolute Lymph # 0.96 (*)     All other components within normal limits   D-DIMER, QUANTITATIVE - Abnormal; Notable for the following components:    D-Dimer, Quant 2.41 (*)     All other components within normal limits   TROPONIN - Abnormal; Notable for the following components:    Troponin, High Sensitivity 21 (*)     All other components within normal limits   MICROSCOPIC URINALYSIS - Abnormal; Notable for the following components:    Bacteria, UA FEW (*)     Amorphous, UA 2+ (*)     All other components within normal limits   PROTIME-INR - Abnormal; Notable for the following components:    Protime 21.5 (*)     All other components within normal limits   CULTURE, URINE   MAGNESIUM   SPECIMEN REJECTION         RADIOLOGY:  XR CHEST PORTABLE    Result Date: 9/19/2021  EXAMINATION: ONE XRAY VIEW OF THE CHEST 9/19/2021 10:12 pm COMPARISON: None. HISTORY: ORDERING SYSTEM PROVIDED HISTORY: CP/SOB TECHNOLOGIST PROVIDED HISTORY: CP/SOB Reason for Exam: chest pain Acuity: Unknown Type of Exam: Unknown FINDINGS: Right lung overall clear. There is moderate left-sided pleural effusion and or infiltrate at the left base. This is similar 2 thoracic spine imaging September 6, 2021. Cardiac size stable. Loop recorder sternotomy wire sutures noted. Mediastinum unremarkable. Osseous structures grossly intact. Telemetry leads overlie the chest.     There is a moderate size left pleural effusion with probable infiltrate at the left base. This appears stable compared to a partial visualization on thoracic spine imaging September 6, 2021. EKG  Rhythm: normal sinus   Rate: normal  Axis: normal  Conduction: normal  ST Segments: no acute change  T Waves: no acute change  Q Waves: no acute change    Clinical Impression: no acute change, but this is a nonspecific EKG. All EKG's are interpreted by the Emergency Department Physician who either signs or Co-signs this chart in the absence of a cardiologist.    EMERGENCY DEPARTMENT COURSE:  ED Course as of Sep 20 0252   Sun Sep 19, 2021   2153 Dr. Adilia Peterson patient    [MS]   2218 Left-sided pleural effusion    [MS]      ED Course User Index  [MS] Ele Garrett, DO     Troponin stable. Does have elevated BNP when compared to previous as well as signs of fluid overload. Does have elevated creatinine compared to previous 1.5. Will give small dose of Lasix. Significant pleural effusion however stable compared to previous.   Has required thoracentesis in the past.  No respiratory distress and with minimal shortness of breath currently. Discussed with Dr. Haim Nieves as well as Dr. Yusef Yan who are agreeable to admission for medication optimization and diuresis. Consult placed to Dr. Liss Louis as well as he is supposed to perform kyphoplasty next week. PROCEDURES:  None    CONSULTS:  IP CONSULT TO CARDIOLOGY  IP CONSULT TO ORTHOPEDIC SURGERY    CRITICAL CARE:  0    FINAL IMPRESSION      1. Clinical systolic heart failure, unspecified heart failure chronicity ([facility] [address]    2. Pleural effusion    3. BAM (acute kidney injury) ([facility] [address]          DISPOSITION / PLAN     DISPOSITION Admitted 09/20/2021 01:27:46 AM        PATIENTREFERRED TO:  No follow-up provider specified.     DISCHARGE MEDICATIONS:  Current Discharge Medication List          Yeyo Gonzales DO  EmergencyMedicine Attending    (Please note that portions of this note were completed with a voice recognition program.  Efforts were made to edit the dictations but occasionally words are mis-transcribed.)        Yeyo Gonzales DO  09/20/21 [id number]

## 2021-09-20 NOTE — PROGRESS NOTES
Comprehensive Nutrition Assessment    Type and Reason for Visit:  Positive Nutrition Screen (Unplanned weight loss or unsure)    Nutrition Recommendations/Plan:   1. Continue Regular diet  2. Start Ensure Enlive 2x/day  3. Monitor p.o intakes and Ensure Enlive acceptance    Nutrition Assessment:  Patient admission is related to pleural effusion, BAM and acute on chronic CHF. Patient fell several weeks ago and suffered truma to buttocks. Patient will need kyphoplasty for fractured lumbar. Patient reports usual body weight is 121 lbs. Current weight recorded as 120 lbs is stated. At this time it is difficult to determine weight loss. Patient does appear to have some temple wasting. Will continue Regular diet and start Ensure Enlive 2x/day. Malnutrition Assessment:  Malnutrition Status:  Insufficient data      Estimated Daily Nutrient Needs:  Energy (kcal):  6247-0109 kcal (25-28 kcal/kg); Weight Used for Energy Requirements:  Current     Protein (g):  71-76 gm of protein (1.3-1.4 gm/kg); Weight Used for Protein Requirements:  Current          Nutrition Related Findings:  Edema: +1 pitting. Active bowel sounds. Lumbar fractures      Wounds:  None       Current Nutrition Therapies:    ADULT DIET;  Regular  Diet NPO    Anthropometric Measures:  · Height: 5' 1\" (154.9 cm)  · Current Body Weight: 120 lb (54.4 kg)    · Usual Body Weight: 121 lb (54.9 kg)     · Ideal Body Weight: 105 lbs; % Ideal Body Weight 114.3 %   · BMI: 22.7  · BMI Categories: Normal Weight (BMI 22.0 to 24.9) age over 72       Nutrition Diagnosis:   · Increased nutrient needs related to increase demand for energy/nutrients as evidenced by other (comment) (lumbar fractures)      Nutrition Interventions:   Food and/or Nutrient Delivery:  Continue Current Diet, Start Oral Nutrition Supplement  Nutrition Education/Counseling:  Education not indicated   Coordination of Nutrition Care:  Continue to monitor while inpatient    Goals:  PO intakes are greater than 50% at meals       Nutrition Monitoring and Evaluation:   Behavioral-Environmental Outcomes:      Food/Nutrient Intake Outcomes:  Food and Nutrient Intake, Supplement Intake  Physical Signs/Symptoms Outcomes:  Biochemical Data, Fluid Status or Edema, Skin, Weight     Discharge Planning:    Continue Oral Nutrition Supplement, Continue current diet       Declan RAMIREZN, RDN, LDN  Lead Clinical Dietitian  RD Office Phone [phone]

## 2021-09-20 NOTE — PLAN OF CARE
Problem: Falls - Risk of:  Goal: Will remain free from falls  Description: Will remain free from falls  Outcome: Ongoing  Note: Patient is a fall risk during this admission. Fall risk assessment was performed. Patient is absent of falls. Bed is in the lowest position. Wheels on the bed are locked. Call light and bed side table are within reach. Clutter is removed. Patient was educated to call out when needing assistance or wanting to get out of bed. Patient offered toileting assistance during rounding. Hourly rounds have been performed. Problem: Cardiac Output - Decreased:  Goal: Hemodynamic stability will improve  Description: Hemodynamic stability will improve  Outcome: Ongoing     Problem: Fluid Volume - Excess:  Goal: Control of fluid volume excess will improve  Description: Control of fluid volume excess will improve  Outcome: Ongoing     Problem: Fluid Volume - Excess:  Goal: Control of fluid volume excess will improve  Description: Control of fluid volume excess will improve  Outcome: Ongoing     Problem: Venous Thromboembolism:  Goal: Will show no signs or symptoms of venous thromboembolism  Description: Will show no signs or symptoms of venous thromboembolism  Outcome: Ongoing     Problem: Venous Thromboembolism:  Goal: Absence of signs or symptoms of impaired coagulation  Description: Absence of signs or symptoms of impaired coagulation  Outcome: Ongoing     Problem: Serum Glucose Level - Abnormal:  Goal: Ability to maintain appropriate glucose levels will improve  Description: Ability to maintain appropriate glucose levels will improve  Outcome: Ongoing  Note: Patient's blood sugars continue to be checked before meals and before bed. Sliding scale insulin available for blood sugars 140 or greater. Physician updated as needed. Problem: Pain:  Goal: Pain level will decrease  Description: Pain level will decrease  Outcome: Ongoing  Note: Pt medicated with pain medication prn.   Assessed all pain characteristics including level, type, location, frequency, and onset. Non-pharmacologic interventions offered to pt as well. Pt states pain is tolerable at this time.  Will continue to monitor

## 2021-09-20 NOTE — ED NOTES
Bed: 23  Expected date:   Expected time:   Means of arrival:   Comments:  [address] [facility]  09/19/21 [id number]

## 2021-09-20 NOTE — CONSULTS
Reason for consult: CHF, Pleural effusion    History of Present Illness  Ms Teresa Lizama is an 77 yo patient known to our practice for CAD, AVR, MVR, TV repair, CHF, paroxysmal atrial fibrillation, HTN, and mixed hyperlipidemia who presented to the ED with back pain. She had a mechanical fall a few weeks ago and sustained a fracture to L1 and possibly L4. She denies chest pain, orthopnea, PND, dizziness, palpitations, or edema. Her weight has been stable at home. Chest xray demonstrated moderate left side effusion. She underwent thoracentesis in August with Dr. Flash Solano. Orthopedics is planning on doing a kyphoplasty. Cardiology is asked to evaluate.      Patient History  Personal History:  Reviewed UTD  Comment:  - Coronary artery disease, status post CABG x2 done 12/9/2020 with LIMA to the LAD and SVG to the Ramus  - history of severe MR, severe TR, moderate AS, moderate AI, status post AVR utilizing a 21 mm Avalus bioprosthesis, extensive aortic root endarterectomy, MVR utilizing a 29 mm Mosaic bioprosthesis, TV repair utilizing Dorie annuloplasty, closure of patent foramen ovale, with ligation of left atrial appendage done 12/9/2020  - atrial fibrillation, status post complete biatrial Castro-Maze IV procedure done 12/9/2020  - 48 hour holter monitor done 5/8/17 revealed minimum HR at 57 bpm, maximum HR at 135 bpm, and short runs of non-sustained PAT  - CHF  - history of dyslipidemia  - diabetes  - cataracts, status post extraction  - seizures  - anemia  - glaucoma  - hernia repair  - stem cell implant (eye)  - skin biopsy  - hysterectomy  - cholecystectomy  - appendectomy  - pleural effusion, status post left sided thoracentesis 6/30/21  - Plaque without significant stenosis of the bilateral carotid arties documented on a carotid doppler done 12/2/2020  - normal LV function, mildly dilated LA, 21 mm Avalus bioprosthesis is well seated, 29 mm Mosaic bioprosthesis, mild TR, moderate pulmonary hypertension, and trace PI documented 100mg QD. Coumadin held for kyphoplasty. Will give Vitamin K.     5. Hx AVR, MVR, TV repair  -Stable. 6. Mixed hyperlipidemia  -Intolerant to Statins. Patient declines other medications. 7. Preoperative clearance  -Patient is at moderate risk of developing perioperative cardiac complications. Ok to proceed with surgery. 8. Anemia  -On Ferrous Sulfate. Check Iron. Will discuss with Dr. Katie Gipson    Discussed with Dr. Katie Gipson. Increase Torsemide to 40mg QD. No need for thoracentesis.

## 2021-09-20 NOTE — FLOWSHEET NOTE
Patient is awake and alert as she reclines while eating dinner. Patient is very approachable and engages in conversation. Patient reports that she has children for support. Patient states that she is tired and not in a good mood. Patient denies any spiritual or emotional concerns and states that she just needs some rest. Patient appears to be coping adequately and expresses appreciation for the visit. Spiritual Care will follow as needed.        09/20/21 [id number]   Encounter Summary   Services provided to: Patient   Referral/Consult From: [address] [place] [address] Road Visiting   (9/20/21)   Complexity of Encounter Moderate   Length of Encounter 15 minutes   Spiritual Assessment Completed Yes   Routine   Type Initial   Assessment Approachable   Intervention Active listening;Explored feelings, thoughts, concerns;Explored coping resources;Nurtured hope   Outcome Expressed gratitude

## 2021-09-20 NOTE — H&P
History & Physical  [facility]    Adult Hospitalist      Name: Brian Dominguez  MRN: [id number]     [place]yside: [de-identified]  Room: 2006/2006-02    Admit Date: 9/19/2021  9:35 PM  PCP: Mark Dale MD    Primary Problem  Active Problems:    Acute on chronic diastolic CHF (congestive heart failure) ([facility] [address]    Pathological fracture of lumbar vertebra due to secondary osteoporosis (HCC)    Intractable back pain    Age-related osteoporosis with current pathological fracture    Bilateral primary osteoarthritis of hip    DDD (degenerative disc disease), lumbar  Resolved Problems:    * No resolved hospital problems. *        Assesment:     · Chest pain  · Acute on chronic diastolic CHF  · Pleural effusion  · Acute kidney injury   · L1 compression fracture, age indeterminate   · Osteoporosis    · CAD, native vessel  · Essential hypertension  · Paroxysmal A fib  · Mixed hyperlipidemia   · Iron deficiency anemia   · DJD Lumbar   · BL hip OA  · Anxiety disorder        Plan:     · Admit to Progressive  · Monitor vitals closely  · Keep SPO2 above 90%  · Is/ Os strict  · Daily weights  · IV diuretics  · Recheck renal function  · Resume Cordarone, ASA, COreg  · Add parameters  · Resume Fe, Synthroid, KCl, Mg  · Xanax prn  · CBC, BMP  · Consult Cardiology  · Consult Ortho/ spine surgery   · D/w RN  · DVT and GI prophylaxis. Chief Complaint:     No chief complaint on file. Chest pain       History of Present Illness:      Brian Dominguez is a 78 y.o.  female who presents with No chief complaint on file. Pt admitted through the ED where she presented with c/o chest pain. Pt says pain has been intermittent with episodes of chest pain lasting ten to twenty minutes. Pain usually occurred at rest. Pain was sharp, moderate, localized over the precordium, non radiating and not associated with nausea, vomiting or diaphoresis. Pt denies having any palpitations, fever, chills, cough or wheezing associated with the chest pain. Provider, MD   ferrous sulfate (IRON 325) 325 (65 Fe) MG tablet Take 325 mg by mouth 21  Yes Historical Provider, MD   potassium chloride (KLOR-CON M) 20 MEQ extended release tablet Take 20 mEq by mouth daily   Yes Historical Provider, MD   metFORMIN (GLUCOPHAGE) 500 MG tablet Take 500 mg by mouth 2 times daily (with meals)   Yes Historical Provider, MD   metoprolol tartrate (LOPRESSOR) 25 MG tablet Take 25 mg by mouth daily    Historical Provider, MD   mirabegron (MYRBETRIQ) 25 MG TB24 Take 25 mg by mouth daily    Historical Provider, MD        Allergies:       Ambien [zolpidem] and Bactrim [sulfamethoxazole-trimethoprim]    Social History:     Tobacco:    reports that she has never smoked. She has never used smokeless tobacco.  Alcohol:      reports no history of alcohol use. Drug Use:  reports no history of drug use. Family History:     History reviewed. No pertinent family history.       Physical Exam:     Vitals:  BP (!) 131/50   Pulse 68   Temp 98.4 °F (36.9 °C) (Oral)   Resp 17   Ht 5' 1\" (1.549 m)   Wt 120 lb (54.4 kg)   SpO2 94%   BMI 22.67 kg/m²   Temp (24hrs), Av.9 °F (36.6 °C), Min:97.5 °F (36.4 °C), Max:98.4 °F (36.9 °C)          General appearance - alert, well appearing, and in no acute distress  Mental status - oriented to person, place, and time with normal affect  Head - normocephalic and atraumatic  Eyes - pupils equal and reactive, extraocular eye movements intact, conjunctiva clear  Ears - hearing appears to be intact  Nose - no drainage noted  Mouth - mucous membranes moist  Neck - supple, no carotid bruits, thyroid not palpable  Chest - clear to auscultation, normal effort  Heart - normal rate, regular rhythm, systolic murmur  Abdomen - soft, nontender, nondistended, bowel sounds present all four quadrants, no masses, hepatomegaly or splenomegaly  Neurological - normal speech, no focal findings or movement disorder noted, cranial nerves II through XII grossly intact  Extremities - peripheral pulses palpable, no pedal edema or calf pain with palpation  Skin - no gross lesions, rashes, or induration noted        Data:     Labs:    Hematology:  Recent Labs     09/19/21  2224 09/20/21  0606   WBC 7.5 6.6   RBC 2.93* 2.77*   HGB 8.9* 8.5*   HCT 27.5* 25.9*   MCV 93.9 93.5   MCH 30.4 30.7   MCHC 32.4 32.8   RDW 13.0 12.8    282   MPV 8.5 8.4   INR 1.9 2.0   DDIMER 2.41*  --      Chemistry:  Recent Labs     09/19/21  2145 09/20/21  0035 09/20/21  0606   *  --  135   K 4.4  --  3.9   CL 91*  --  92*   CO2 28  --  33*   GLUCOSE 170*  --  99   BUN 30*  --  28*   CREATININE 1.54*  --  1.50*   MG 1.6  --   --    ANIONGAP 15  --  10   LABGLOM 32*  --  33*   GFRAA 39*  --  40*   CALCIUM 9.4  --  9.3   PROBNP 2,037*  --  2,965*   TROPHS 19* 21*  --      Recent Labs     09/20/21  0305 09/20/21  0606 09/20/21  1143 09/20/21  1624   TSH  --  6.82*  --   --    POCGLU 114*  --  142* 147*       Lab Results   Component Value Date    INR 2.0 09/20/2021    INR 1.9 09/19/2021    PROTIME 22.2 (H) 09/20/2021    PROTIME 21.5 (H) 09/19/2021       Lab Results   Component Value Date/Time    SPECIAL 20 CC RIGHT WRIST 01/24/2020 10:10 PM     Lab Results   Component Value Date/Time    CULTURE NO GROWTH 6 DAYS 01/24/2020 10:10 PM       No results found for: POCPH, PHART, PH, POCPCO2, CDI6COK, PCO2, POCPO2, PO2ART, PO2, POCHCO3, WWK3JKG, HCO3, NBEA, PBEA, BEART, BE, THGBART, THB, CEW4GLP, HTOH0BPQ, F2BSZRIP, O2SAT, FIO2    Radiology:    XR CHEST PORTABLE    Result Date: 9/19/2021  There is a moderate size left pleural effusion with probable infiltrate at the left base. This appears stable compared to a partial visualization on thoracic spine imaging September 6, 2021.          All radiological studies reviewed                Code Status:  Full Code    Electronically signed by Sarah Acevedo MD on 9/20/2021 at 5:55 PM     Copy sent to Dr. Ok Wolf MD    This note was created with the assistance of a speech-recognition program.  Although the intention is to generate a document that actually reflects the content of the visit, no guarantees can be provided that every mistake has been identified and corrected by editing. Note was updated later by me after  physical examination and  completion of the assessment.

## 2021-09-20 NOTE — PROGRESS NOTES
Pt admitted to room per cart in good condition from ED. Oriented to room and surroundings  Bed in lowest position, wheels locked, 2/4 side rails up  Call light in reach, room free of clutter, adequate lighting provided  Denies any further questions at this time  Instructed to call out with any questions/concerns/new onset of pain and/or n/v   White board updated  Continue to monitor with hourly rounding   Bed alarm on/Fall Risk signs in place/Fall risk sticker to wrist band  Non-skid socks on/at bedside  [address] in place for patient/family to view & ask questions.

## 2021-09-20 NOTE — PROGRESS NOTES
Writer requested a page out to Dr. Vern Matthew through [place] answering service per orders for patient's arrival to unit. [place] answering service paging NP Gail Nunez who is not on call for Dr. Vern Matthew. REJI Nunez tried to call Dr. Vern Matthew, still no answer. [place] answering service made aware of problem, they are trying to find a solution.

## 2021-09-20 NOTE — PROGRESS NOTES
Diana Chapman was evaluated today and a DME order was entered for a wheeled walker with seat because she requires this to successfully complete daily living tasks of personal cares and ambulating. A wheeled walker with seat is necessary due to the patient's unsteady gait, upper body weakness, inability to  and ambulation device, ambulating only short distances by pushing a walker, and the need to sit for a short time before resuming ambulation. These tasks cannot be completed with a lesser ambulation device such as a cane, crutch, or standard walker. The need for this equipment was discussed with the patient and she understands and is in agreement.

## 2021-09-20 NOTE — ACP (ADVANCE CARE PLANNING)
Advance Care Planning     Advance Care Planning Activator (Inpatient)  Conversation Note      Date of ACP Conversation: 9/20/2021     Conversation Conducted with: Patient with Decision Making Capacity    ACP Activator: Cheri Saint, RN        Health Care Decision Maker: Self    Current Designated Health Care Decision Maker: self     Click here to complete Healthcare Decision Makers including section of the Healthcare Decision Maker Relationship (ie \"Primary\")  Today we documented Decision Maker(s). The patient will provide ACP documents. Care Preferences    Ventilation: \"If you were in your present state of health and suddenly became very ill and were unable to breathe on your own, what would your preference be about the use of a ventilator (breathing machine) if it were available to you? \"      Would the patient desire the use of ventilator (breathing machine)?: yes    \"If your health worsens and it becomes clear that your chance of recovery is unlikely, what would your preference be about the use of a ventilator (breathing machine) if it were available to you? \"     Would the patient desire the use of ventilator (breathing machine)?: undecided       Resuscitation  \"CPR works best to restart the heart when there is a sudden event, like a heart attack, in someone who is otherwise healthy. Unfortunately, CPR does not typically restart the heart for people who have serious health conditions or who are very sick. \"    \"In the event your heart stopped as a result of an underlying serious health condition, would you want attempts to be made to restart your heart (answer \"yes\" for attempt to resuscitate) or would you prefer a natural death (answer \"no\" for do not attempt to resuscitate)? \" undecided        [] Yes   [] No   Educated Patient / Reuel Ego regarding differences between Advance Directives and portable DNR orders.     Length of ACP Conversation in minutes:  15  Conversation Outcomes:  [x] ACP discussion completed  [] Existing advance directive reviewed with patient; no changes to patient's previously recorded wishes  [] New Advance Directive completed  [] Portable Do Not Rescitate prepared for Provider review and signature  [] POLST/POST/MOLST/MOST prepared for Provider review and signature      Follow-up plan:    [] Schedule follow-up conversation to continue planning  [x] Referred individual to Provider for additional questions/concerns   [] Advised patient/agent/surrogate to review completed ACP document and update if needed with changes in condition, patient preferences or care setting    [] This note routed to one or more involved healthcare providers

## 2021-09-20 NOTE — PROGRESS NOTES
Physical Therapy  DATE: 2021    NAME: Ji Quezada  MRN: [id number]   : 1940    Patient not seen this date for Physical Therapy due to:  [] Blood transfusion in progress  [] Cancel by RN  [] Hemodialysis  []  Refusal by Patient   [] Spine Precautions   [] Strict Bedrest  [] Surgery  [] Testing      [x] Other:  Pt has acute L1 compression fx. Orthopedic surgery planning for kyphoplasty tomorrow. PT will continue to follow and ck post-op. [] PT being discontinued at this time. Patient independent. No further needs. [] PT being discontinued at this time as the patient has been transferred to hospice care. No further needs.     Gwen Kay, PT

## 2021-09-20 NOTE — CONSULTS
Orthopedic Consult    Requesting Physician: Dr. Jon Mendoza: Spine pain    HISTORY OF PRESENT ILLNESS:      The patient is a 78 y.o. female  who presents with evaluated second floor [facility] having required ER admission with BAM, intractable spine pain. She had a direct buttock trauma fall several weeks ago. Intractable mechanical lumbar pain since. 9-6-2021 thoracolumbar films noted osteoporotic compression fracture deformity L1 acute possible 4. Degenerative changes with DDD and bilateral hip arthritis. Office follow-up discussed treatment options including TLSO, kyphoplasty vertebral augmentation, biopsy. Surgery would decrease pain, decrease kyphotic deformity, eliminate the need for spine orthosis, obtain tissue diagnosis. She was scheduled for this. She is on Coumadin anticoagulants and was told her INR would need to be 1.5. No current complaints in her right or left upper extremity, right or left lower extremity. Past orthopedic history negative for rheumatoid arthritis, lupus, gout.     Past Medical History:    Past Medical History:   Diagnosis Date    Diabetes mellitus ([address]     Hypertension     Tachycardia        Past Surgical History:    Past Surgical History:   Procedure Laterality Date    ABDOMEN SURGERY      BREAST SURGERY      HYSTERECTOMY         Medications Prior to Admission:   Current Facility-Administered Medications   Medication Dose Route Frequency Provider Last Rate Last Admin    sodium chloride flush 0.9 % injection 5-40 mL  5-40 mL IntraVENous 2 times per day Lamar Garcia MD        sodium chloride flush 0.9 % injection 5-40 mL  5-40 mL IntraVENous PRN Lamar Garcia MD        0.9 % sodium chloride infusion  25 mL IntraVENous PRN Lamar Garcia MD        acetaminophen (TYLENOL) tablet 650 mg  650 mg Oral Q4H PRN Lamar Garcia MD   650 mg at 09/20/21 0513    ondansetron (ZOFRAN) injection 4 mg  4 mg IntraVENous Q6H PRN Giselle Ronquillo MD PHYSICAL EXAM:  Patient Vitals for the past 24 hrs:   BP Temp Temp src Pulse Resp SpO2 Height Weight   09/20/21 0240 (!) 144/53 97.9 °F (36.6 °C) Oral 71 18 96 % -- --   09/20/21 0200 (!) 143/65 -- -- 73 -- -- -- --   09/20/21 0145 137/64 -- -- 73 -- -- -- --   09/20/21 0030 (!) 138/56 -- -- 70 24 96 % -- --   09/20/21 0000 (!) 146/61 -- -- 70 19 96 % -- --   09/19/21 2330 (!) 142/57 -- -- 70 23 96 % -- --   09/19/21 2140 (!) 156/69 -- -- -- -- -- 5' 1\" (1.549 m) 120 lb (54.4 kg)   09/19/21 2135 -- 97.9 °F (36.6 °C) Oral 79 17 95 % -- --     Gen: alert and oriented  Head: normorcephalic, atraumatic  Neck: supple  Heart: RRR  Lungs: No audible wheezes  Abdomen: soft  Pelvis: stable  Extremity no evidence of lateralizing radiculopathy or myopathy motor or sensory and reflex C5-S1. Range of motion deferred due to pain. Thoracolumbar hyperkyphosis. Right and left upper extremity satisfactory. Shoulder, elbow, wrist, hand without edema, ecchymosis, crepitus, pain. Right and left lower extremity also satisfactory. Again no edema, ecchymosis, crepitus, pain with right or left hip, knee, ankle, foot range. DATA:  CBC:   Lab Results   Component Value Date    WBC 6.6 09/20/2021    HGB 8.5 09/20/2021     09/20/2021     BMP:    Lab Results   Component Value Date     09/20/2021    K 3.9 09/20/2021    CL 92 09/20/2021    CO2 33 09/20/2021    BUN 28 09/20/2021    CREATININE 1.50 09/20/2021    CALCIUM 9.3 09/20/2021    GLUCOSE 99 09/20/2021     PT/INR:    Lab Results   Component Value Date    PROTIME 22.2 09/20/2021    INR 2.0 09/20/2021     Troponin:  No results found for: [address]    Radiology:     Imaging reviewed. 9-6 show acute compression deformity L1 age-indeterminate L4. Osteoporosis. Lumbar degenerative disc disease. Bilateral hip osteoarthritis right greater than left with chondrolysis, marginal osteophytes. No segmental instability.     1- CT of the abdomen, pelvis reviewed for bone windows. L4 superior endplate Schmorl's node. Lumbar facet arthritis, bilateral right greater than left hip OA. ASSESSMENT:  Active Problems:    Acute on chronic diastolic CHF (congestive heart failure) (HCC)    Pathological fracture of lumbar vertebra due to secondary osteoporosis (HCC)    Intractable back pain    Age-related osteoporosis with current pathological fracture  Resolved Problems:    * No resolved hospital problems. *       PLAN:  1) MRI thoracic and lumbar spine without contrast    2. Vitamin D 25-hydroxy level    3. Hold Coumadin, discuss anticoagulants with definitive treatment per Dr. Latif    4. Spine treatment options reviewed. Patient wishes to proceed with kyphoplasty vertebral augmentation, biopsy. We will schedule with medical clearance, INR 1.5 or less. 5.  Thank you very much for the consultation. We will follow her closely with you.         Tuyet Cavanaugh MD

## 2021-09-20 NOTE — DISCHARGE INSTR - COC
Mobility/ADLs:  Walking   Independent  Transfer  Assisted  Bathing  Assisted  Dressing  Assisted  Toileting  Independent  Feeding  Independent  Med Admin  Assisted  Med Delivery   whole    Wound Care Documentation and Therapy:  Mid back wound care:  cleanse with saline, pat dry. Apply Santyl ointment, nickel thickness to cover wound bed. Cover with oil emulsion dressing. Cover with foam dressing. Change daily     Elimination:  Continence:   · Bowel: Yes  · Bladder: Yes  Urinary Catheter: None   Colostomy/Ileostomy/Ileal Conduit: No       Date of Last BM: ***    Intake/Output Summary (Last 24 hours) at 9/20/2021 1503  Last data filed at 9/20/2021 1318  Gross per 24 hour   Intake 120 ml   Output 1150 ml   Net -1030 ml     I/O last 3 completed shifts: In: 120 [P.O.:120]  Out: 1150 [Urine:1150]    Safety Concerns:     History of Falls (last 30 days) and At Risk for Falls    Impairments/Disabilities:      Vision right eye     Nutrition Therapy:  Current Nutrition Therapy:   - Oral Diet:  Carb Control 4 carbs/meal (1800kcals/day)    Routes of Feeding: Oral  Liquids: No Restrictions  Daily Fluid Restriction: no  Last Modified Barium Swallow with Video (Video Swallowing Test): not done    Treatments at the Time of Hospital Discharge:   Respiratory Treatments: ***  Oxygen Therapy:  is not on home oxygen therapy. Ventilator:    - No ventilator support    Rehab Therapies: Physical Therapy and Occupational Therapy - Please work on stamina/endurance exercises.    Weight Bearing Status/Restrictions: No weight bearing restirctions  Other Medical Equipment (for information only, NOT a DME order):  walker  Other Treatments:   1) SN for Assessment  2) SN for Medication Teaching & Compliance    Patient's personal belongings (please select all that are sent with patient):  Glasses    RN SIGNATURE:  Electronically signed by Jeanne Cox RN on 9/23/21 at 11:24 AM EDT    CASE MANAGEMENT/SOCIAL WORK SECTION    Inpatient Status Date: 9/20/21    Readmission Risk Assessment Score:  Readmission Risk              Risk of Unplanned Readmission:  15           Discharging to Facility/ Agency   · Name: [facility]  · Address:  · Phone: [phone]        Fax: [phone]    *** PLEASE CALL DAUGHTER Lizabeth Hand .[phone] TO SET UP START OF CARE***    / signature: Electronically signed by Lg Hogan RN on 9/20/21 at 3:04 PM EDT/ Max Palacios RN on 9-22-21 at 11:12am    PHYSICIAN SECTION    Prognosis: Good    Condition at Discharge: Stable    Rehab Potential (if transferring to Rehab): Good    Recommended Labs or Other Treatments After Discharge: Diagnosis:    Skilled Nursing per hospital recommendation ( Monitor Vitals,pain, Mental status, daily weight Blood sugar monitoring TIDAC.) Call MD if Blood sugar<60 or > 350,Fall precaution, wound care swanson catheter removal as per Nursing home attending)  Skilled OT  Physical Therapy  Daily Labs: cbc,bmp q weekly  Monitor INR Daily if pt on coumadin   Coumadin management per SNF admitting physician unless otherwise specified. Oxygen 2L/minute   Blood sugar accucheck TIDAC  Daily Pulse oxymetry  Continue CPAP/BIPAP if pt wearing at home. Catheter/drain care per surgery  If pt on Tube feeding- please continue per hospital orders. Physician Certification: I certify the above information and transfer of Austin Pepe  is necessary for the continuing treatment of the diagnosis listed and that she requires [address] for greater 30 days.      Update Admission H&P: No change in H&P    PHYSICIAN SIGNATURE:  Electronically signed by Kendal Gutierrez MD on 9/23/21 at 10:32 AM EDT

## 2021-09-20 NOTE — CARE COORDINATION
Case Management Initial Discharge Plan  [place],         Readmission Risk              Risk of Unplanned Readmission:  15             Met with:patient or family member patient and daughter to discuss discharge plans. Information verified: address, contacts, phone number, , insurance Yes  PCP: Jacqueline Liu MD  Date of last visit: 1 month ago     Insurance Provider: [employer]     Discharge Planning  Current Residence:  2 story home with dtr   Living Arrangements:  Alone Lives with dtr [facility] has 2 stories/13 stairs to climb 2nd floor-bedroom has FF bath  Support Systems:  Family Members, Children  Current Services PTA: vns   Agency: [facility] 1x weekly  Patient able to perform ADL's:Assisted  DME in home:  Transport chair, 2 ww, shower seat, bp cuff, glucometer  DME used to aid ambulation prior to admission:   Radha Budd   DME used during admission:  TBD     Potential Assistance Needed:  N/A    Pharmacy: [facility]    Potential Assistance Purchasing Medications:  No  Does patient want to participate in local refill/ meds to beds program?  Yes    Patient agreeable to home care: Yes  Freedom of choice provided:  yes      Type of Home Care Services:  None  Patient expects to be discharged to:       Prior SNF/Rehab Placement and Facility: [facility] never go back   Agreeable to SNF/Rehab: No  [place] of choice provided: n/a   Evaluation: n/a    Expected Discharge date:  21  Follow Up Appointment: Best Day/ Time: Monday PM    Transportation provider: [facility]equiel   Transportation arrangements needed for discharge: No    Discharge Plan: [address] UNSIGNED. [facility] in am if inr <1.5. Pt is from home with dtr Malka Bhatti as caregiver. Current with [facility]-refusing snf, will go [facility].  DME-Transport chair, 2 ww, shower seat, bp cuff, glucometer. NEEDS F2F FOR ROLLATOR FAXED TO [facility]-they have order. Coumadin home med Dr. Sivan Walters office manages.     Spoke with pt and her dtr Malka Bhatti at bedside. Pt lives with dtr Mikala-she is her primary caregiver. She has been to [facility] in  Dec 2020 after  Heart surgery. She was displeased and will never agree to snf again. If PT/OT recs snf-she will still opt for home with [facility]. [facility] is current with [facility] 1[facility], if pt needs PT/OT they will go thru [place]. Messaged Tamela from [place] confirmed pt is current. Pt's coumadin is managed by Dr. Vinnie Escobar office, He draws blood. Pt dtr is requesting a rollator-she believes pt pcp sent in order to [facility] spoke with Jannet Meier do have an order but they need chart notes/face to face. Will ask attending/ortho for face to face-then will need faxed to [address] then they can deliver rollator to her home. [address] fax [phone]. The Plan for Transition of Care is related to the following treatment goals: SN PT OT     The Patient and/or patient representative  was provided with a choice of provider and agrees   with the discharge plan. [x] Yes [] No    Freedom of choice list was provided with basic dialogue that supports the patient's individualized plan of care/goals, treatment preferences and shares the quality data associated with the providers.  [x] Yes [] No  Electronically signed by Cullen Reed RN on 9/20/21 at 11:52 AM EDT

## 2021-09-20 NOTE — ED NOTES
Pt states she does not know her medications, that her daughter organizes them and give them to her every day      Tiki Leung RN  09/19/21 [id number]

## 2021-09-21 ENCOUNTER — APPOINTMENT (OUTPATIENT)
Dept: GENERAL RADIOLOGY | Age: 81
DRG: 981 | End: 2021-09-21
Payer: COMMERCIAL

## 2021-09-21 ENCOUNTER — ANESTHESIA (OUTPATIENT)
Dept: OPERATING ROOM | Age: 81
DRG: 981 | End: 2021-09-21
Payer: COMMERCIAL

## 2021-09-21 ENCOUNTER — ANESTHESIA EVENT (OUTPATIENT)
Dept: OPERATING ROOM | Age: 81
DRG: 981 | End: 2021-09-21
Payer: COMMERCIAL

## 2021-09-21 VITALS — SYSTOLIC BLOOD PRESSURE: 131 MMHG | OXYGEN SATURATION: 100 % | DIASTOLIC BLOOD PRESSURE: 61 MMHG

## 2021-09-21 LAB
ABSOLUTE EOS #: 0.29 K/UL (ref 0–0.44)
ABSOLUTE IMMATURE GRANULOCYTE: 0.05 K/UL (ref 0–0.3)
ABSOLUTE LYMPH #: 1.28 K/UL (ref 1.1–3.7)
ABSOLUTE MONO #: 0.79 K/UL (ref 0.1–1.2)
ANION GAP SERPL CALCULATED.3IONS-SCNC: 10 MMOL/L (ref 9–17)
BASOPHILS # BLD: 1 % (ref 0–2)
BASOPHILS ABSOLUTE: 0.05 K/UL (ref 0–0.2)
BUN BLDV-MCNC: 20 MG/DL (ref 8–23)
BUN/CREAT BLD: 15 (ref 9–20)
CALCIUM SERPL-MCNC: 9 MG/DL (ref 8.6–10.4)
CHLORIDE BLD-SCNC: 95 MMOL/L (ref 98–107)
CO2: 31 MMOL/L (ref 20–31)
CREAT SERPL-MCNC: 1.32 MG/DL (ref 0.5–0.9)
CULTURE: NORMAL
DIFFERENTIAL TYPE: ABNORMAL
EOSINOPHILS RELATIVE PERCENT: 4 % (ref 1–4)
GFR AFRICAN AMERICAN: 47 ML/MIN
GFR NON-AFRICAN AMERICAN: 39 ML/MIN
GFR SERPL CREATININE-BSD FRML MDRD: ABNORMAL ML/MIN/{1.73_M2}
GFR SERPL CREATININE-BSD FRML MDRD: ABNORMAL ML/MIN/{1.73_M2}
GLUCOSE BLD-MCNC: 110 MG/DL (ref 65–105)
GLUCOSE BLD-MCNC: 142 MG/DL (ref 65–105)
GLUCOSE BLD-MCNC: 177 MG/DL (ref 65–105)
GLUCOSE BLD-MCNC: 96 MG/DL (ref 65–105)
GLUCOSE BLD-MCNC: 98 MG/DL (ref 70–99)
HCT VFR BLD CALC: 26 % (ref 36.3–47.1)
HEMOGLOBIN: 8.5 G/DL (ref 11.9–15.1)
IMMATURE GRANULOCYTES: 1 %
INR BLD: 1.5
IRON SATURATION: 15 % (ref 20–55)
IRON: 33 UG/DL (ref 37–145)
LYMPHOCYTES # BLD: 19 % (ref 24–43)
Lab: NORMAL
MCH RBC QN AUTO: 30.7 PG (ref 25.2–33.5)
MCHC RBC AUTO-ENTMCNC: 32.7 G/DL (ref 28.4–34.8)
MCV RBC AUTO: 93.9 FL (ref 82.6–102.9)
MONOCYTES # BLD: 12 % (ref 3–12)
NRBC AUTOMATED: 0 PER 100 WBC
PDW BLD-RTO: 12.7 % (ref 11.8–14.4)
PLATELET # BLD: 304 K/UL (ref 138–453)
PLATELET ESTIMATE: ABNORMAL
PMV BLD AUTO: 8.7 FL (ref 8.1–13.5)
POTASSIUM SERPL-SCNC: 3.6 MMOL/L (ref 3.7–5.3)
PROTHROMBIN TIME: 17.4 SEC (ref 11.5–14.2)
RBC # BLD: 2.77 M/UL (ref 3.95–5.11)
RBC # BLD: ABNORMAL 10*6/UL
SEG NEUTROPHILS: 63 % (ref 36–65)
SEGMENTED NEUTROPHILS ABSOLUTE COUNT: 4.43 K/UL (ref 1.5–8.1)
SODIUM BLD-SCNC: 136 MMOL/L (ref 135–144)
SPECIMEN DESCRIPTION: NORMAL
TOTAL IRON BINDING CAPACITY: 217 UG/DL (ref 250–450)
UNSATURATED IRON BINDING CAPACITY: 184 UG/DL (ref 112–347)
WBC # BLD: 6.9 K/UL (ref 3.5–11.3)
WBC # BLD: ABNORMAL 10*3/UL

## 2021-09-21 PROCEDURE — 85610 PROTHROMBIN TIME: CPT

## 2021-09-21 PROCEDURE — 3700000001 HC ADD 15 MINUTES (ANESTHESIA): Performed by: ORTHOPAEDIC SURGERY

## 2021-09-21 PROCEDURE — 2500000003 HC RX 250 WO HCPCS: Performed by: ORTHOPAEDIC SURGERY

## 2021-09-21 PROCEDURE — 0QU03JZ SUPPLEMENT LUMBAR VERTEBRA WITH SYNTHETIC SUBSTITUTE, PERCUTANEOUS APPROACH: ICD-10-PCS | Performed by: ORTHOPAEDIC SURGERY

## 2021-09-21 PROCEDURE — 82947 ASSAY GLUCOSE BLOOD QUANT: CPT

## 2021-09-21 PROCEDURE — 7100000001 HC PACU RECOVERY - ADDTL 15 MIN: Performed by: ORTHOPAEDIC SURGERY

## 2021-09-21 PROCEDURE — 3600000002 HC SURGERY LEVEL 2 BASE: Performed by: ORTHOPAEDIC SURGERY

## 2021-09-21 PROCEDURE — 85025 COMPLETE CBC W/AUTO DIFF WBC: CPT

## 2021-09-21 PROCEDURE — 2720000010 HC SURG SUPPLY STERILE: Performed by: ORTHOPAEDIC SURGERY

## 2021-09-21 PROCEDURE — 3700000000 HC ANESTHESIA ATTENDED CARE: Performed by: ORTHOPAEDIC SURGERY

## 2021-09-21 PROCEDURE — 6360000002 HC RX W HCPCS: Performed by: NURSE ANESTHETIST, CERTIFIED REGISTERED

## 2021-09-21 PROCEDURE — C9359 IMPLNT,BON VOID FILLER-PUTTY: HCPCS | Performed by: ORTHOPAEDIC SURGERY

## 2021-09-21 PROCEDURE — 2580000003 HC RX 258: Performed by: NURSE ANESTHETIST, CERTIFIED REGISTERED

## 2021-09-21 PROCEDURE — 2500000003 HC RX 250 WO HCPCS: Performed by: NURSE ANESTHETIST, CERTIFIED REGISTERED

## 2021-09-21 PROCEDURE — 36415 COLL VENOUS BLD VENIPUNCTURE: CPT

## 2021-09-21 PROCEDURE — 2580000003 HC RX 258: Performed by: ORTHOPAEDIC SURGERY

## 2021-09-21 PROCEDURE — C1713 ANCHOR/SCREW BN/BN,TIS/BN: HCPCS | Performed by: ORTHOPAEDIC SURGERY

## 2021-09-21 PROCEDURE — 6370000000 HC RX 637 (ALT 250 FOR IP): Performed by: ORTHOPAEDIC SURGERY

## 2021-09-21 PROCEDURE — 88307 TISSUE EXAM BY PATHOLOGIST: CPT

## 2021-09-21 PROCEDURE — 83540 ASSAY OF IRON: CPT

## 2021-09-21 PROCEDURE — 6370000000 HC RX 637 (ALT 250 FOR IP): Performed by: FAMILY MEDICINE

## 2021-09-21 PROCEDURE — 0QS03ZZ REPOSITION LUMBAR VERTEBRA, PERCUTANEOUS APPROACH: ICD-10-PCS | Performed by: ORTHOPAEDIC SURGERY

## 2021-09-21 PROCEDURE — 6360000002 HC RX W HCPCS: Performed by: ANESTHESIOLOGY

## 2021-09-21 PROCEDURE — 88342 IMHCHEM/IMCYTCHM 1ST ANTB: CPT

## 2021-09-21 PROCEDURE — 83550 IRON BINDING TEST: CPT

## 2021-09-21 PROCEDURE — 7100000000 HC PACU RECOVERY - FIRST 15 MIN: Performed by: ORTHOPAEDIC SURGERY

## 2021-09-21 PROCEDURE — 1200000000 HC SEMI PRIVATE

## 2021-09-21 PROCEDURE — 88311 DECALCIFY TISSUE: CPT

## 2021-09-21 PROCEDURE — 0QB00ZX EXCISION OF LUMBAR VERTEBRA, OPEN APPROACH, DIAGNOSTIC: ICD-10-PCS | Performed by: ORTHOPAEDIC SURGERY

## 2021-09-21 PROCEDURE — 3600000012 HC SURGERY LEVEL 2 ADDTL 15MIN: Performed by: ORTHOPAEDIC SURGERY

## 2021-09-21 PROCEDURE — 2709999900 HC NON-CHARGEABLE SUPPLY: Performed by: ORTHOPAEDIC SURGERY

## 2021-09-21 PROCEDURE — 80048 BASIC METABOLIC PNL TOTAL CA: CPT

## 2021-09-21 PROCEDURE — C1894 INTRO/SHEATH, NON-LASER: HCPCS | Performed by: ORTHOPAEDIC SURGERY

## 2021-09-21 PROCEDURE — 88341 IMHCHEM/IMCYTCHM EA ADD ANTB: CPT

## 2021-09-21 PROCEDURE — 3209999900 FLUORO FOR SURGICAL PROCEDURES

## 2021-09-21 DEVICE — CEMENT C01A KYPHX HV-R BONE CEMENT EN
Type: IMPLANTABLE DEVICE | Site: BACK | Status: FUNCTIONAL
Brand: KYPHON® HV-R® BONE CEMENT

## 2021-09-21 DEVICE — IMPLANTABLE DEVICE: Type: IMPLANTABLE DEVICE | Site: BACK | Status: FUNCTIONAL

## 2021-09-21 RX ORDER — ACETAMINOPHEN 650 MG/1
650 SUPPOSITORY RECTAL EVERY 6 HOURS PRN
Status: DISCONTINUED | OUTPATIENT
Start: 2021-09-21 | End: 2021-09-23 | Stop reason: HOSPADM

## 2021-09-21 RX ORDER — ONDANSETRON 4 MG/1
4 TABLET, ORALLY DISINTEGRATING ORAL EVERY 8 HOURS PRN
Status: DISCONTINUED | OUTPATIENT
Start: 2021-09-21 | End: 2021-09-23 | Stop reason: HOSPADM

## 2021-09-21 RX ORDER — CYCLOBENZAPRINE HCL 10 MG
10 TABLET ORAL 3 TIMES DAILY PRN
Status: DISCONTINUED | OUTPATIENT
Start: 2021-09-21 | End: 2021-09-23 | Stop reason: HOSPADM

## 2021-09-21 RX ORDER — SODIUM CHLORIDE 0.9 % (FLUSH) 0.9 %
5-40 SYRINGE (ML) INJECTION PRN
Status: DISCONTINUED | OUTPATIENT
Start: 2021-09-21 | End: 2021-09-23 | Stop reason: HOSPADM

## 2021-09-21 RX ORDER — CEFAZOLIN SODIUM 1 G/3ML
INJECTION, POWDER, FOR SOLUTION INTRAMUSCULAR; INTRAVENOUS PRN
Status: DISCONTINUED | OUTPATIENT
Start: 2021-09-21 | End: 2021-09-21 | Stop reason: SDUPTHER

## 2021-09-21 RX ORDER — DIPHENHYDRAMINE HYDROCHLORIDE 50 MG/ML
12.5 INJECTION INTRAMUSCULAR; INTRAVENOUS
Status: DISCONTINUED | OUTPATIENT
Start: 2021-09-21 | End: 2021-09-21 | Stop reason: HOSPADM

## 2021-09-21 RX ORDER — LABETALOL HYDROCHLORIDE 5 MG/ML
5 INJECTION, SOLUTION INTRAVENOUS EVERY 10 MIN PRN
Status: DISCONTINUED | OUTPATIENT
Start: 2021-09-21 | End: 2021-09-21 | Stop reason: HOSPADM

## 2021-09-21 RX ORDER — SODIUM CHLORIDE, SODIUM LACTATE, POTASSIUM CHLORIDE, CALCIUM CHLORIDE 600; 310; 30; 20 MG/100ML; MG/100ML; MG/100ML; MG/100ML
INJECTION, SOLUTION INTRAVENOUS CONTINUOUS
Status: DISCONTINUED | OUTPATIENT
Start: 2021-09-21 | End: 2021-09-21

## 2021-09-21 RX ORDER — BUPIVACAINE HYDROCHLORIDE AND EPINEPHRINE 5; 5 MG/ML; UG/ML
INJECTION, SOLUTION EPIDURAL; INTRACAUDAL; PERINEURAL PRN
Status: DISCONTINUED | OUTPATIENT
Start: 2021-09-21 | End: 2021-09-21 | Stop reason: HOSPADM

## 2021-09-21 RX ORDER — LEVOTHYROXINE SODIUM 0.03 MG/1
25 TABLET ORAL DAILY
Status: DISCONTINUED | OUTPATIENT
Start: 2021-09-21 | End: 2021-09-23 | Stop reason: HOSPADM

## 2021-09-21 RX ORDER — HYDROMORPHONE HYDROCHLORIDE 1 MG/ML
0.5 INJECTION, SOLUTION INTRAMUSCULAR; INTRAVENOUS; SUBCUTANEOUS EVERY 5 MIN PRN
Status: DISCONTINUED | OUTPATIENT
Start: 2021-09-21 | End: 2021-09-21 | Stop reason: HOSPADM

## 2021-09-21 RX ORDER — 0.9 % SODIUM CHLORIDE 0.9 %
500 INTRAVENOUS SOLUTION INTRAVENOUS
Status: DISCONTINUED | OUTPATIENT
Start: 2021-09-21 | End: 2021-09-21 | Stop reason: HOSPADM

## 2021-09-21 RX ORDER — TIMOLOL MALEATE 5 MG/ML
1 SOLUTION/ DROPS OPHTHALMIC DAILY
Status: DISCONTINUED | OUTPATIENT
Start: 2021-09-21 | End: 2021-09-23 | Stop reason: HOSPADM

## 2021-09-21 RX ORDER — SODIUM CHLORIDE 0.9 % (FLUSH) 0.9 %
10 SYRINGE (ML) INJECTION PRN
Status: DISCONTINUED | OUTPATIENT
Start: 2021-09-21 | End: 2021-09-21 | Stop reason: SDUPTHER

## 2021-09-21 RX ORDER — LIDOCAINE HYDROCHLORIDE 20 MG/ML
INJECTION, SOLUTION EPIDURAL; INFILTRATION; INTRACAUDAL; PERINEURAL PRN
Status: DISCONTINUED | OUTPATIENT
Start: 2021-09-21 | End: 2021-09-21 | Stop reason: SDUPTHER

## 2021-09-21 RX ORDER — TRAMADOL HYDROCHLORIDE 50 MG/1
50 TABLET ORAL EVERY 6 HOURS PRN
Status: DISCONTINUED | OUTPATIENT
Start: 2021-09-21 | End: 2021-09-23 | Stop reason: HOSPADM

## 2021-09-21 RX ORDER — ACETAMINOPHEN 325 MG/1
650 TABLET ORAL EVERY 6 HOURS PRN
Status: DISCONTINUED | OUTPATIENT
Start: 2021-09-21 | End: 2021-09-23 | Stop reason: HOSPADM

## 2021-09-21 RX ORDER — HYDRALAZINE HYDROCHLORIDE 20 MG/ML
5 INJECTION INTRAMUSCULAR; INTRAVENOUS EVERY 10 MIN PRN
Status: DISCONTINUED | OUTPATIENT
Start: 2021-09-21 | End: 2021-09-21 | Stop reason: HOSPADM

## 2021-09-21 RX ORDER — PROPOFOL 10 MG/ML
INJECTION, EMULSION INTRAVENOUS PRN
Status: DISCONTINUED | OUTPATIENT
Start: 2021-09-21 | End: 2021-09-21 | Stop reason: SDUPTHER

## 2021-09-21 RX ORDER — SODIUM CHLORIDE 9 MG/ML
INJECTION, SOLUTION INTRAVENOUS CONTINUOUS PRN
Status: DISCONTINUED | OUTPATIENT
Start: 2021-09-21 | End: 2021-09-21 | Stop reason: SDUPTHER

## 2021-09-21 RX ORDER — SODIUM CHLORIDE 9 MG/ML
25 INJECTION, SOLUTION INTRAVENOUS PRN
Status: DISCONTINUED | OUTPATIENT
Start: 2021-09-21 | End: 2021-09-23 | Stop reason: HOSPADM

## 2021-09-21 RX ORDER — SODIUM CHLORIDE 0.9 % (FLUSH) 0.9 %
5-40 SYRINGE (ML) INJECTION EVERY 12 HOURS SCHEDULED
Status: DISCONTINUED | OUTPATIENT
Start: 2021-09-21 | End: 2021-09-23 | Stop reason: HOSPADM

## 2021-09-21 RX ORDER — AMIODARONE HYDROCHLORIDE 200 MG/1
100 TABLET ORAL DAILY
Status: DISCONTINUED | OUTPATIENT
Start: 2021-09-21 | End: 2021-09-23 | Stop reason: HOSPADM

## 2021-09-21 RX ORDER — METOCLOPRAMIDE HYDROCHLORIDE 5 MG/ML
10 INJECTION INTRAMUSCULAR; INTRAVENOUS
Status: DISCONTINUED | OUTPATIENT
Start: 2021-09-21 | End: 2021-09-21 | Stop reason: HOSPADM

## 2021-09-21 RX ORDER — POLYETHYLENE GLYCOL 3350 17 G/17G
17 POWDER, FOR SOLUTION ORAL DAILY PRN
Status: DISCONTINUED | OUTPATIENT
Start: 2021-09-21 | End: 2021-09-23 | Stop reason: HOSPADM

## 2021-09-21 RX ORDER — FENTANYL CITRATE 50 UG/ML
25 INJECTION, SOLUTION INTRAMUSCULAR; INTRAVENOUS EVERY 5 MIN PRN
Status: DISCONTINUED | OUTPATIENT
Start: 2021-09-21 | End: 2021-09-21 | Stop reason: HOSPADM

## 2021-09-21 RX ORDER — TROSPIUM CHLORIDE 20 MG/1
20 TABLET, FILM COATED ORAL NIGHTLY
Status: DISCONTINUED | OUTPATIENT
Start: 2021-09-21 | End: 2021-09-21 | Stop reason: RX

## 2021-09-21 RX ORDER — WARFARIN SODIUM 2 MG/1
2 TABLET ORAL
Status: COMPLETED | OUTPATIENT
Start: 2021-09-21 | End: 2021-09-21

## 2021-09-21 RX ORDER — SODIUM CHLORIDE 0.9 % (FLUSH) 0.9 %
5-40 SYRINGE (ML) INJECTION EVERY 12 HOURS SCHEDULED
Status: DISCONTINUED | OUTPATIENT
Start: 2021-09-21 | End: 2021-09-21 | Stop reason: SDUPTHER

## 2021-09-21 RX ORDER — ONDANSETRON 2 MG/ML
4 INJECTION INTRAMUSCULAR; INTRAVENOUS EVERY 6 HOURS PRN
Status: DISCONTINUED | OUTPATIENT
Start: 2021-09-21 | End: 2021-09-23 | Stop reason: HOSPADM

## 2021-09-21 RX ADMIN — WARFARIN SODIUM 2 MG: 2 TABLET ORAL at 17:50

## 2021-09-21 RX ADMIN — HYDROMORPHONE HYDROCHLORIDE 0.5 MG: 1 INJECTION, SOLUTION INTRAMUSCULAR; INTRAVENOUS; SUBCUTANEOUS at 12:37

## 2021-09-21 RX ADMIN — SODIUM CHLORIDE, PRESERVATIVE FREE 10 ML: 5 INJECTION INTRAVENOUS at 21:00

## 2021-09-21 RX ADMIN — SODIUM CHLORIDE: 9 INJECTION, SOLUTION INTRAVENOUS at 11:27

## 2021-09-21 RX ADMIN — PROPOFOL 20 MG: 10 INJECTION, EMULSION INTRAVENOUS at 11:53

## 2021-09-21 RX ADMIN — PROPOFOL 20 MG: 10 INJECTION, EMULSION INTRAVENOUS at 12:02

## 2021-09-21 RX ADMIN — ACETAMINOPHEN 650 MG: 325 TABLET ORAL at 17:50

## 2021-09-21 RX ADMIN — PROPOFOL 20 MG: 10 INJECTION, EMULSION INTRAVENOUS at 11:50

## 2021-09-21 RX ADMIN — INSULIN LISPRO 2 UNITS: 100 INJECTION, SOLUTION INTRAVENOUS; SUBCUTANEOUS at 17:50

## 2021-09-21 RX ADMIN — PROPOFOL 10 MG: 10 INJECTION, EMULSION INTRAVENOUS at 12:00

## 2021-09-21 RX ADMIN — PROPOFOL 10 MG: 10 INJECTION, EMULSION INTRAVENOUS at 12:04

## 2021-09-21 RX ADMIN — PROPOFOL 10 MG: 10 INJECTION, EMULSION INTRAVENOUS at 12:08

## 2021-09-21 RX ADMIN — TIMOLOL MALEATE 1 DROP: 5 SOLUTION OPHTHALMIC at 17:58

## 2021-09-21 RX ADMIN — PROPOFOL 10 MG: 10 INJECTION, EMULSION INTRAVENOUS at 11:38

## 2021-09-21 RX ADMIN — INSULIN LISPRO 1 UNITS: 100 INJECTION, SOLUTION INTRAVENOUS; SUBCUTANEOUS at 21:00

## 2021-09-21 RX ADMIN — CARVEDILOL 6.25 MG: 6.25 TABLET, FILM COATED ORAL at 10:01

## 2021-09-21 RX ADMIN — CEFAZOLIN 2000 MG: 1 INJECTION, POWDER, FOR SOLUTION INTRAMUSCULAR; INTRAVENOUS at 11:40

## 2021-09-21 RX ADMIN — TORSEMIDE 40 MG: 20 TABLET ORAL at 17:58

## 2021-09-21 RX ADMIN — PROPOFOL 20 MG: 10 INJECTION, EMULSION INTRAVENOUS at 11:43

## 2021-09-21 RX ADMIN — SODIUM CHLORIDE: 9 INJECTION, SOLUTION INTRAVENOUS at 12:15

## 2021-09-21 RX ADMIN — LIDOCAINE HYDROCHLORIDE 60 MG: 20 INJECTION, SOLUTION EPIDURAL; INFILTRATION; INTRACAUDAL; PERINEURAL at 11:35

## 2021-09-21 RX ADMIN — PROPOFOL 20 MG: 10 INJECTION, EMULSION INTRAVENOUS at 11:52

## 2021-09-21 RX ADMIN — PROPOFOL 10 MG: 10 INJECTION, EMULSION INTRAVENOUS at 12:06

## 2021-09-21 RX ADMIN — AMIODARONE HYDROCHLORIDE 100 MG: 200 TABLET ORAL at 10:02

## 2021-09-21 RX ADMIN — ACETAMINOPHEN 650 MG: 325 TABLET ORAL at 02:29

## 2021-09-21 RX ADMIN — PROPOFOL 10 MG: 10 INJECTION, EMULSION INTRAVENOUS at 12:11

## 2021-09-21 RX ADMIN — PROPOFOL 10 MG: 10 INJECTION, EMULSION INTRAVENOUS at 11:40

## 2021-09-21 RX ADMIN — HYDROMORPHONE HYDROCHLORIDE 0.5 MG: 1 INJECTION, SOLUTION INTRAMUSCULAR; INTRAVENOUS; SUBCUTANEOUS at 12:56

## 2021-09-21 RX ADMIN — PROPOFOL 30 MG: 10 INJECTION, EMULSION INTRAVENOUS at 11:35

## 2021-09-21 RX ADMIN — PROPOFOL 20 MG: 10 INJECTION, EMULSION INTRAVENOUS at 11:58

## 2021-09-21 RX ADMIN — PROPOFOL 20 MG: 10 INJECTION, EMULSION INTRAVENOUS at 11:55

## 2021-09-21 RX ADMIN — PROPOFOL 10 MG: 10 INJECTION, EMULSION INTRAVENOUS at 12:13

## 2021-09-21 RX ADMIN — PROPOFOL 20 MG: 10 INJECTION, EMULSION INTRAVENOUS at 11:46

## 2021-09-21 RX ADMIN — CARVEDILOL 6.25 MG: 6.25 TABLET, FILM COATED ORAL at 17:53

## 2021-09-21 ASSESSMENT — PULMONARY FUNCTION TESTS
PIF_VALUE: 0
PIF_VALUE: 1
PIF_VALUE: 0
PIF_VALUE: 1
PIF_VALUE: 0
PIF_VALUE: 1
PIF_VALUE: 0

## 2021-09-21 ASSESSMENT — PAIN DESCRIPTION - ORIENTATION
ORIENTATION: MID;LOWER
ORIENTATION: MID
ORIENTATION: LOWER;MID

## 2021-09-21 ASSESSMENT — PAIN - FUNCTIONAL ASSESSMENT
PAIN_FUNCTIONAL_ASSESSMENT: PREVENTS OR INTERFERES SOME ACTIVE ACTIVITIES AND ADLS

## 2021-09-21 ASSESSMENT — PAIN DESCRIPTION - PROGRESSION
CLINICAL_PROGRESSION: GRADUALLY IMPROVING
CLINICAL_PROGRESSION: NOT CHANGED

## 2021-09-21 ASSESSMENT — PAIN DESCRIPTION - LOCATION
LOCATION: BACK

## 2021-09-21 ASSESSMENT — PAIN SCALES - GENERAL
PAINLEVEL_OUTOF10: 10
PAINLEVEL_OUTOF10: 4
PAINLEVEL_OUTOF10: 10
PAINLEVEL_OUTOF10: 10
PAINLEVEL_OUTOF10: 9
PAINLEVEL_OUTOF10: 3
PAINLEVEL_OUTOF10: 0

## 2021-09-21 ASSESSMENT — PAIN DESCRIPTION - FREQUENCY
FREQUENCY: CONTINUOUS

## 2021-09-21 ASSESSMENT — PAIN DESCRIPTION - PAIN TYPE
TYPE: SURGICAL PAIN
TYPE: ACUTE PAIN
TYPE: SURGICAL PAIN
TYPE: SURGICAL PAIN

## 2021-09-21 ASSESSMENT — PAIN DESCRIPTION - ONSET
ONSET: ON-GOING

## 2021-09-21 ASSESSMENT — PAIN DESCRIPTION - DESCRIPTORS
DESCRIPTORS: ACHING;CONSTANT
DESCRIPTORS: ACHING
DESCRIPTORS: SORE

## 2021-09-21 NOTE — PROGRESS NOTES
[facility]    Patient Name:  Osmani Berkowiztkeeper    RSN:[id number]       SUBJECTIVE:  Patient denies any chest pain or dyspnea. States that she has back pain. She is anxious about surgery today. VS: BP (!) 155/53   Pulse 71   Temp 97.7 °F (36.5 °C) (Oral)   Resp 16   Ht 5' 1\" (1.549 m)   Wt 119 lb (54 kg)   SpO2 96%   BMI 22.48 kg/m²   Wt: Weight change:   I/O: I/O last 3 completed shifts: In: 860 [P.O.:860]  Out: 2000 [Urine:2000]  No intake/output data recorded. Monitor: SR    Meds:  Scheduled Meds:   trospium  20 mg Oral Nightly    amiodarone  100 mg Oral Daily    levothyroxine  25 mcg Oral Daily    timolol  1 drop Both Eyes Daily    sodium chloride flush  5-40 mL IntraVENous 2 times per day    aspirin  81 mg Oral Daily    carvedilol  6.25 mg Oral BID WC    ferrous sulfate  325 mg Oral Daily with breakfast    magnesium oxide  400 mg Oral Daily    potassium chloride  20 mEq Oral Daily with breakfast    insulin lispro  0-12 Units SubCUTAneous TID WC    insulin lispro  0-6 Units SubCUTAneous Nightly    torsemide  40 mg Oral Daily      Continuous Infusions:   sodium chloride      dextrose       PRN Meds: sodium chloride flush, sodium chloride, ondansetron **OR** ondansetron, polyethylene glycol, acetaminophen **OR** acetaminophen, glucose, dextrose, glucagon (rDNA), dextrose, ALPRAZolam     Physical Exam:    General Appearance: NAD, A&Ox3. Skin: Pink, warm and dry. Pulmonary/Chest: Diminished at bases anteriorly. Cardiovascular: S1S2, RRR, negative JVD. Extremities: Negative peripheral edema.     Labs:    CBC with Differential:    Lab Results   Component Value Date    WBC 6.9 09/21/2021    RBC 2.77 09/21/2021    HGB 8.5 09/21/2021    HCT 26.0 09/21/2021     09/21/2021    MCV 93.9 09/21/2021    MCH 30.7 09/21/2021    MCHC 32.7 09/21/2021    RDW 12.7 09/21/2021   [  BMP:     Lab Results   Component Value Date     09/21/2021    K 3.6 09/21/2021    CL 95 09/21/2021    CO2 31 09/21/2021    BUN 20 09/21/2021    CREATININE 1.32 09/21/2021     PT/INR:    Lab Results   Component Value Date    INR 1.5 09/21/2021     CMP:     Lab Results   Component Value Date     09/21/2021    K 3.6 09/21/2021    CL 95 09/21/2021    CO2 31 09/21/2021    BUN 20 09/21/2021    CREATININE 1.32 09/21/2021    CALCIUM 9.0 09/21/2021     TSH:    Lab Results   Component Value Date    TSH 6.82 09/20/2021       Assessment/Plan:    Active Problems:    Acute on chronic diastolic CHF (congestive heart failure) ([facility] [place] [address]    Pathological fracture of lumbar vertebra due to secondary osteoporosis (HCC)    Intractable back pain    Age-related osteoporosis with current pathological fracture    Bilateral primary osteoarthritis of hip    DDD (degenerative disc disease), lumbar  Resolved Problems:    * No resolved hospital problems. *     1. CAD  -Stable and angina free. On ASA and B. Blocker. Intolerant to Statins.      2. HTN  -BP mildly elevated overnight. Recheck after morning medications.      3. CHF/Pleural effusion  -Secondary to chronic LV diastolic dysfunction, renal insufficiency, and anemia. No plans for thoracentesis. On Demadex 40mg daily.      4. Paroxysmal atrial fibrillation  -S/P Maze. In SR. On Amiodarone and B. Blocker. Coumadin held for Kyphoplasty.      5. Hx AVR, MVR, TV repair  -Stable.      6. Mixed hyperlipidemia  -Intolerant to Statins. Patient declines other medications.      7. Anemia  -On Ferrous Sulfate. Iron low from chronic disease. 8. Lumbar fracture  -Ortho following. Plans for Kyphoplasty today. Will review with Dr. Vania Lyon.      Electronically signed by AMBROCIO Person - CNP on 9/21/2021 at 10:39 AM

## 2021-09-21 NOTE — OP NOTE
[id number] [facility] [address]                [address] [place] [address]                                OPERATIVE REPORT    PATIENT NAME: Jigar Paulino                          :        1940  MED REC NO:   [id number]                             ROOM:  ACCOUNT NO:   [de-identified]                           ADMIT DATE: 2021  PROVIDER:     Dorothy Null MD    DATE OF PROCEDURE:  2021    PREOPERATIVE DIAGNOSIS:  Pathological osteoporotic L1 compression  fracture. POSTOPERATIVE DIAGNOSIS:  Pathological osteoporotic L1 compression  fracture. ADDITIONAL DIAGNOSIS:  Healed pathological osteoporotic L4 compression  fracture. PROCEDURES:  1. Kyphoplasty vertebral augmentation of pathological L1 compression  fracture. 2.  Anesthesia by surgeon - 54 modifier. 3.  Regional Marcaine lumbar block (36295). 4.  Biopsy, pathological L1 compression fracture. 5.  AP and lateral fluoroscopy of lumbar spine. ESTIMATED BLOOD LOSS:  5 mL. INDICATIONS:  The patient is a very frail 78year-old who has had  intractable spine pain, required admission to [facility]  via the emergency room. As an outpatient, I evaluated her for spine  symptoms. She has had fragility mechanism injury. Imaging as  outpatient included radiographs which suggested compression fracture  deformity L1-L4 with osteoporosis. This was followed by a [facility]  MRI noting low-signal T1, high-signal STIR changes present only at L1. Treatment options were discussed. Nonoperative care via TLSO, pain  control, restricted activities versus surgery. Kyphoplasty vertebral  augmentation with biopsy was recommended to decrease pain, decrease  kyphotic deformity, eliminate the need for spine orthosis, obtain tissue  diagnosis.   Risks of procedure including infection, phlebitis,  neurovascular or tendon injury, anesthetic complications, failure of  procedure, new fractures amongst others images were interpreted by myself regarding fracture reduction and  implant position. On the back table, methylmethacrylate was prepared. It was warmed to  proper consistency and then back filled into the void created. The fill  was excellent throughout the anterior column superior to inferior  endplate without canal extrusions. Satisfied with the procedure, the  wounds were closed with a 3-0 nylon and a 4 x 4 ABD dressing secured. Having tolerated the procedure well, the patient was transferred to the  recovery room in stable condition.         Julián Saxena MD    D: 09/21/2021 12:31:53       T: 09/21/2021 12:36:20     [email or web address]  Job#: [id number]     Doc#: [id number]    CC:  Dr. Noa Ernst MD

## 2021-09-21 NOTE — ANESTHESIA PRE PROCEDURE
Department of Anesthesiology  Preprocedure Note       Name:  Renuka Shipley   Age:  78 y.o.  :  1940                                          MRN:  [id number]         Date:  2021      Surgeon: Kassandra Butt): Kirk Ernandez MD    Procedure: Procedure(s):  L1 KYPHOPLASTY WITH BIOPSY CARMS    Medications prior to admission:   Prior to Admission medications    Medication Sig Start Date End Date Taking?  Authorizing Provider   levothyroxine (SYNTHROID) 25 MCG tablet Take 25 mcg by mouth daily   Yes Historical Provider, MD   MAGNESIUM PO Take 400 mg by mouth daily   Yes Historical Provider, MD   timolol (TIMOPTIC) 0.5 % ophthalmic solution timolol maleate 0.5 % eye drops   Yes Historical Provider, MD   torsemide (DEMADEX) 20 MG tablet 1 tablet daily in the afternoon another tablet every other day 21  Yes Historical Provider, MD   warfarin (COUMADIN) 2 MG tablet TAKE 1 TABLET BY MOUTH EVERY DAY 21  Yes Historical Provider, MD   amiodarone (CORDARONE) 200 MG tablet TAKE 1 TABLET BY MOUTH EVERY DAY 21  Yes Historical Provider, MD   aspirin 81 MG EC tablet Take 81 mg by mouth 21  Yes Historical Provider, MD   carvedilol (COREG) 6.25 MG tablet TAKE 1 TABLET BY MOUTH TWICE A DAY WITH FOOD 21  Yes Historical Provider, MD   ferrous sulfate (IRON 325) 325 (65 Fe) MG tablet Take 325 mg by mouth 21  Yes Historical Provider, MD   potassium chloride (KLOR-CON M) 20 MEQ extended release tablet Take 20 mEq by mouth daily   Yes Historical Provider, MD   metFORMIN (GLUCOPHAGE) 500 MG tablet Take 500 mg by mouth 2 times daily (with meals)   Yes Historical Provider, MD   metoprolol tartrate (LOPRESSOR) 25 MG tablet Take 25 mg by mouth daily    Historical Provider, MD   mirabegron (MYRBETRIQ) 25 MG TB24 Take 25 mg by mouth daily    Historical Provider, MD       Current medications:    Current Facility-Administered Medications   Medication Dose Route Frequency Provider Last Rate Last Admin    trospium (SANCTURA) tablet 20 mg  20 mg Oral Nightly Lamar Garcia MD        amiodarone (CORDARONE) tablet 100 mg  100 mg Oral Daily Lamar Garcia MD   100 mg at 09/21/21 1002    levothyroxine (SYNTHROID) tablet 25 mcg  25 mcg Oral Daily Lamar Garcia MD        timolol (TIMOPTIC) 0.5 % ophthalmic solution 1 drop  1 drop Both Eyes Daily Lamar Garcia MD        sodium chloride flush 0.9 % injection 5-40 mL  5-40 mL IntraVENous 2 times per day Ludy Carr MD        sodium chloride flush 0.9 % injection 10 mL  10 mL IntraVENous PRN Lamar Garcia MD        0.9 % sodium chloride infusion  25 mL IntraVENous PRN Lamar Garcia MD        ondansetron (ZOFRAN-ODT) disintegrating tablet 4 mg  4 mg Oral Q8H PRN Lamar Garcia MD        Or    ondansetron (ZOFRAN) injection 4 mg  4 mg IntraVENous Q6H PRN Lamar Garcia MD        polyethylene glycol (GLYCOLAX) packet 17 g  17 g Oral Daily PRN Lamar Garcia MD        acetaminophen (TYLENOL) tablet 650 mg  650 mg Oral Q6H PRN Lamar Garcia MD   650 mg at 09/21/21 0229    Or    acetaminophen (TYLENOL) suppository 650 mg  650 mg Rectal Q6H PRN Lamar Garcia MD        aspirin chewable tablet 81 mg  81 mg Oral Daily Lamar Garcia MD   81 mg at 09/20/21 0905    carvedilol (COREG) tablet 6.25 mg  6.25 mg Oral BID WC Lamar Garcia MD   6.25 mg at 09/21/21 1001    ferrous sulfate (FE TABS 325) EC tablet 325 mg  325 mg Oral Daily with breakfast Lamar Garcia MD   325 mg at 09/20/21 0905    magnesium oxide (MAG-OX) tablet 400 mg  400 mg Oral Daily Lamar Garcia MD   400 mg at 09/20/21 0905    potassium chloride (KLOR-CON M) extended release tablet 20 mEq  20 mEq Oral Daily with breakfast Lamar Garcia MD   20 mEq at 09/20/21 0905    glucose (GLUTOSE) 40 % oral gel 15 g  15 g Oral PRN Lamar Garcia MD        dextrose 50 % IV solution  12.5 g IntraVENous PRN Lamar Garcia MD        glucagon (rDNA) injection 1 mg  1 mg IntraMUSCular PRN MD Lisa Santana dextrose 5 % solution  100 mL/hr IntraVENous PRN Lamar Garcia MD        insulin lispro (HUMALOG) injection vial 0-12 Units  0-12 Units SubCUTAneous TID WC Lamar Garcia MD   2 Units at 09/20/21 1805    insulin lispro (HUMALOG) injection vial 0-6 Units  0-6 Units SubCUTAneous Nightly Lamar Garcia MD        torsemide (DEMADEX) tablet 40 mg  40 mg Oral Daily AMBROCIO Rubi - CNP        ALPRAZolam Noa Heys) tablet 0.25 mg  0.25 mg Oral TID PRN Lamar Garcia MD   0.25 mg at 09/20/21 7084       Allergies:     Allergies   Allergen Reactions    Ambien [Zolpidem]     Bactrim [Sulfamethoxazole-Trimethoprim]        Problem List:    Patient Active Problem List   Diagnosis Code    Acute on chronic diastolic CHF (congestive heart failure) ([facility]) I50.33    Pathological fracture of lumbar vertebra due to secondary osteoporosis ([facility]) M80.88XA    Intractable back pain M54.9    Age-related osteoporosis with current pathological fracture M80.00XA    Bilateral primary osteoarthritis of hip M16.0    DDD (degenerative disc disease), lumbar M51.36       Past Medical History:        Diagnosis Date    [facility] [address]     Hypertension     Tachycardia        Past Surgical History:        Procedure Laterality Date    ABDOMEN SURGERY      BREAST SURGERY      HYSTERECTOMY         Social History:    Social History     Tobacco Use    Smoking status: Never Smoker    Smokeless tobacco: Never Used   Substance Use Topics    Alcohol use: Never                                Counseling given: Not Answered      Vital Signs (Current):   Vitals:    09/20/21 1841 09/20/21 1841 09/20/21 2345 09/21/21 0803   BP:  (!) 148/52 (!) 148/51 (!) 155/53   Pulse: 70 69 71 71   Resp: 17  16 16   Temp: 98.8 °F (37.1 °C)  98.1 °F (36.7 °C) 97.7 °F (36.5 °C)   TempSrc: Oral  Oral Oral   SpO2: 95% 97% 94% 96%   Weight:    119 lb (54 kg)   Height:                                                  BP Readings from Last 3 Encounters: 09/21/21 (!) 155/53   09/06/21 (!) 189/68   01/24/20 (!) 147/68       NPO Status: Time of last liquid consumption: 2355                        Time of last solid consumption: 1800                        Date of last liquid consumption: 09/20/21                        Date of last solid food consumption: 09/20/21    BMI:   Wt Readings from Last 3 Encounters:   09/21/21 119 lb (54 kg)   09/06/21 121 lb (54.9 kg)   01/24/20 128 lb 4 oz (58.2 kg)     Body mass index is 22.48 kg/m².     CBC:   Lab Results   Component Value Date    WBC 6.9 09/21/2021    RBC 2.77 09/21/2021    HGB 8.5 09/21/2021    HCT 26.0 09/21/2021    MCV 93.9 09/21/2021    RDW 12.7 09/21/2021     09/21/2021       CMP:   Lab Results   Component Value Date     09/21/2021    K 3.6 09/21/2021    CL 95 09/21/2021    CO2 31 09/21/2021    BUN 20 09/21/2021    CREATININE 1.32 09/21/2021    GFRAA 47 09/21/2021    LABGLOM 39 09/21/2021    GLUCOSE 98 09/21/2021    CALCIUM 9.0 09/21/2021       POC Tests:   Recent Labs     09/21/21  0648   POCGLU 96       Coags:   Lab Results   Component Value Date    PROTIME 17.4 09/21/2021    INR 1.5 09/21/2021       HCG (If Applicable): No results found for: PREGTESTUR, PREGSERUM, HCG, HCGQUANT     ABGs: No results found for: PHART, PO2ART, PKI5PUX, DZR1DAQ, BEART, V0EAHQRZ     Type & Screen (If Applicable):  No results found for: LABABO, LABRH    Drug/Infectious Status (If Applicable):  No results found for: HIV, HEPCAB    COVID-19 Screening (If Applicable):   Lab Results   Component Value Date    COVID19 Not Detected 09/20/2021           Anesthesia Evaluation  Patient summary reviewed and Nursing notes reviewed no history of anesthetic complications:   Airway: Mallampati: III  TM distance: >3 FB   Neck ROM: full  Mouth opening: > = 3 FB Dental: normal exam         Pulmonary:normal exam  breath sounds clear to auscultation                            ROS comment: Moderate left pleural effusion Cardiovascular:  Exercise tolerance: good (>4 METS),   (+) hypertension:, valvular problems/murmurs:, CABG/stent (CABG x 2 2020): no interval change, dysrhythmias: atrial fibrillation, CHF: no interval change,       ECG reviewed  Rhythm: regular  Rate: normal  Echocardiogram reviewed    Cleared by cardiology     Beta Blocker:  Dose within 24 Hrs      ROS comment: CAD, s/p CABG x2, AVR, MVR, TV repair in 2020, CHF, paroxysmal atrial fibrillation, HTN     Neuro/Psych:                ROS comment: L1 compression fracture GI/Hepatic/Renal:   (+) renal disease: CRI,           Endo/Other:    (+) DiabetesType II DM, , hypothyroidism, blood dyscrasia (Hb 8.5): anemia and anticoagulation therapy, arthritis: OA., .                 Abdominal:             Vascular: Other Findings:           Anesthesia Plan      MAC     ASA 3       Induction: intravenous. MIPS: Postoperative opioids intended and Prophylactic antiemetics administered. Anesthetic plan and risks discussed with patient. Plan discussed with CRNA.                   Thea Nissen, MD   9/21/2021

## 2021-09-21 NOTE — PROGRESS NOTES
Warfarin Dosing - Pharmacy Consult Note  Consulting Provider: Virgil Solorio  Indication:  Atrial Fibrillation  Warfarin Dose prior to admission: 2mg daily   Concurrent anticoagulants/antiplatelets: none  Significant Drug Interactions: No obvious interactions  Recent Labs     09/19/21  2224 09/19/21  2224 09/20/21  0606 09/20/21  0606 09/20/21  1934 09/20/21  2324 09/21/21  0408   INR 1.9   < > 2.0   < > 1.7 1.5 1.5   HGB 8.9*  --  8.5*  --   --   --  8.5*     --  282  --   --   --  304    < > = values in this interval not displayed. Recent warfarin administrations        No warfarin orders with administrations found. Date   INR    Dose  9/21         1.5      none     Assessment/Plan  (Goal INR: 2 - 3)  Coumadin 2mg today. Inr in am.     Active problem list reviewed. INR orders are placed. Chart reviewed for pertinent labs, drug/diet interactions, and past doses. Documentation of patient's clinical condition was reviewed. Pharmacy Dosing:  Pharmacy will continue to follow.

## 2021-09-21 NOTE — FLOWSHEET NOTE
09/21/21 1115   Mobility   Patient Departure From Unit 1115  (transferred to pre-op area in stable condition )   Transport Method Bed

## 2021-09-21 NOTE — PROGRESS NOTES
Patient transported to room in no distress, report given at bedside Normal rate, regular rhythm.  Heart sounds S1, S2.  No murmurs, rubs or gallops.

## 2021-09-21 NOTE — FLOWSHEET NOTE
09/21/21 1305   Mobility   Patient Arrival To Unit   (returned to room 2006 from PACU in stable condition,VSS)   Transport Method Bed

## 2021-09-21 NOTE — OP NOTE
Operative Note      Patient: Fuad Gonzáles  YOB: 1940  MRN: [id number]    Date of Procedure: 9/21/2021    Pre-Op Diagnosis: DX COMPRESSION FX; pathological osteoporotic L1 compression fracture    Post-Op Diagnosis: Same and Healed pathological osteoporotic L4 compression fracture       Procedure(s):  L1 KYPHOPLASTY WITH BIOPSY CARMS; biopsy pathological osteoporotic L1 compression fracture; regional Marcaine lumbar block; kyphoplasty vertebral augmentation pathological L1 compression fracture    Surgeon(s): Anuj Aguilar MD    Assistant:   * No surgical staff found *    Anesthesia: Monitor Anesthesia Care    Estimated Blood Loss (mL): Minimal    Complications: None    Specimens:   ID Type Source Tests Collected by Time Destination   A : L1 BIOPSY Bone Spine SURGICAL PATHOLOGY Anuj Aguilar MD 9/21/2021 1159        Implants:  Implant Name Type Inv.  Item Serial No.  Lot No. LRB No. Used Action   CEMENT BNE FULL DOSE HI VISC RADPQ W/O ANTIBIO FOR  CEMENT BNE FULL DOSE HI VISC RADPQ W/O ANTIBIO FOR  [employer] [facility] [place] [id number] N/A 1 Implanted         Drains: * No LDAs found *    Findings: Osteoporosis    Detailed Description of Procedure:   See dictation    Electronically signed by Anuj Aguilar MD on 9/21/2021 at 12:16 PM

## 2021-09-21 NOTE — PLAN OF CARE
Problem: Falls - Risk of:  Goal: Will remain free from falls  Description: Will remain free from falls  9/21/2021 0409 by Cy Medrano RN  Outcome: Ongoing  9/20/2021 1520 by Anthony Calzada RN  Outcome: Ongoing  Note: Patient is a fall risk during this admission. Fall risk assessment was performed. Patient is absent of falls. Bed is in the lowest position. Wheels on the bed are locked. Call light and bed side table are within reach. Clutter is removed. Patient was educated to call out when needing assistance or wanting to get out of bed. Patient offered toileting assistance during rounding. Hourly rounds have been performed. Goal: Absence of physical injury  Description: Absence of physical injury  9/21/2021 0409 by Cy Medrano RN  Outcome: Ongoing  9/20/2021 1520 by Anthony Calzada RN  Outcome: Ongoing     Problem:  Activity Intolerance:  Goal: Ability to tolerate increased activity will improve  Description: Ability to tolerate increased activity will improve  9/21/2021 0409 by Cy Medrano RN  Outcome: Ongoing  9/20/2021 1520 by Anthony Calzada RN  Outcome: Ongoing     Problem: Cardiac Output - Decreased:  Goal: Hemodynamic stability will improve  Description: Hemodynamic stability will improve  9/21/2021 0409 by Cy Medrano RN  Outcome: Ongoing  9/20/2021 1520 by Anthony Calzada RN  Outcome: Ongoing     Problem: Fluid Volume - Excess:  Goal: Control of fluid volume excess will improve  Description: Control of fluid volume excess will improve  9/21/2021 0409 by Cy Medrano RN  Outcome: Ongoing  9/20/2021 1520 by Anthony Calzada RN  Outcome: Ongoing     Problem: Venous Thromboembolism:  Goal: Will show no signs or symptoms of venous thromboembolism  Description: Will show no signs or symptoms of venous thromboembolism  9/21/2021 0409 by Cy Medrano RN  Outcome: Ongoing  9/20/2021 1520 by Anthony Calzada RN  Outcome: Ongoing  Goal: Absence of signs or symptoms of impaired coagulation  Description: Absence of signs or symptoms of impaired coagulation  9/21/2021 0409 by Mariana Velasco RN  Outcome: Ongoing  9/20/2021 1520 by Daryn Bean RN  Outcome: Ongoing     Problem: Serum Glucose Level - Abnormal:  Goal: Ability to maintain appropriate glucose levels will improve  Description: Ability to maintain appropriate glucose levels will improve  9/21/2021 0409 by Mariana Velasco RN  Outcome: Ongoing  9/20/2021 1520 by Daryn Bean RN  Outcome: Ongoing  Note: Patient's blood sugars continue to be checked before meals and before bed. Sliding scale insulin available for blood sugars 140 or greater. Physician updated as needed. Problem: Pain:  Goal: Pain level will decrease  Description: Pain level will decrease  9/21/2021 0409 by Mariana Velasco RN  Outcome: Ongoing  9/20/2021 1520 by Daryn Bean RN  Outcome: Ongoing  Note: Pt medicated with pain medication prn. Assessed all pain characteristics including level, type, location, frequency, and onset. Non-pharmacologic interventions offered to pt as well. Pt states pain is tolerable at this time.  Will continue to monitor   Goal: Control of acute pain  Description: Control of acute pain  9/21/2021 0409 by Mariana Velasco RN  Outcome: Ongoing  9/20/2021 1520 by Daryn Bean RN  Outcome: Ongoing  Goal: Control of chronic pain  Description: Control of chronic pain  9/21/2021 0409 by Mariana Velasco RN  Outcome: Ongoing  9/20/2021 1520 by Daryn Bean RN  Outcome: Ongoing     Problem: Nutrition  Goal: Optimal nutrition therapy  Outcome: Ongoing

## 2021-09-21 NOTE — PLAN OF CARE
Problem: Falls - Risk of:  Goal: Will remain free from falls  Description: Will remain free from falls  9/21/2021 1711 by Adriel Hernandez RN  Note: Patient remains free from injuries and falls, appropriate measures in place       Problem: Activity Intolerance:  Goal: Ability to tolerate increased activity will improve  Description: Ability to tolerate increased activity will improve  9/21/2021 1711 by Adriel Hernandez RN  Outcome: Ongoing     Problem: Cardiac Output - Decreased:  Goal: Hemodynamic stability will improve  Description: Hemodynamic stability will improve  9/21/2021 1711 by Adriel Hernandez RN  Outcome: Ongoing     Problem: Fluid Volume - Excess:  Goal: Control of fluid volume excess will improve  Description: Control of fluid volume excess will improve  9/21/2021 1711 by Adriel Hernandez RN  Outcome: Ongoing     Problem: Venous Thromboembolism:  Goal: Will show no signs or symptoms of venous thromboembolism  Description: Will show no signs or symptoms of venous thromboembolism  9/21/2021 1711 by Adriel Hernandez RN  Outcome: Ongoing     Problem: Venous Thromboembolism:  Goal: Absence of signs or symptoms of impaired coagulation  Description: Absence of signs or symptoms of impaired coagulation  9/21/2021 1711 by Adriel Hernandez RN  Outcome: Ongoing     Problem: Serum Glucose Level - Abnormal:  Goal: Ability to maintain appropriate glucose levels will improve  Description: Ability to maintain appropriate glucose levels will improve  9/21/2021 1711 by Adriel Hernandez RN  Note: Patient's blood sugars continue to be checked before meals and before bed. Sliding scale insulin available for blood sugars 140 or greater. Physician updated as needed.       Problem: Pain:  Goal: Pain level will decrease  Description: Pain level will decrease  9/21/2021 1711 by Adriel Hernandez RN  Outcome: Ongoing     Problem: Pain:  Goal: Control of acute pain  Description: Control of acute pain  9/21/2021 1711 by Adriel Hernandez RN  Note: Patient's pain well controlled with ordered pain medications and alternate therapies

## 2021-09-21 NOTE — PROGRESS NOTES
PRN  traMADol, 50 mg, Q6H PRN  sodium chloride flush, 5-40 mL, PRN  sodium chloride, 25 mL, PRN  cyclobenzaprine, 10 mg, TID PRN  glucose, 15 g, PRN  dextrose, 12.5 g, PRN  glucagon (rDNA), 1 mg, PRN  dextrose, 100 mL/hr, PRN  ALPRAZolam, 0.25 mg, TID PRN            Subjective:     Patient seen and examined at bedside. No overnight events. No acute complaints today. Afebrile  Pt. Denies any CP, SOB, palpitation, HA, dizziness, chills, cough, cold, changes in urination, BM or skin changes or any pain. ROS:  A 10 point system reviewed and negative otherwise mentioned above. Physical Examination:      Vitals:  BP (!) 137/48   Pulse 65   Temp 97.9 °F (36.6 °C) (Oral)   Resp 16   Ht 5' 1\" (1.549 m)   Wt 119 lb (54 kg)   SpO2 95%   BMI 22.48 kg/m²   Temp (24hrs), Av.8 °F (36.6 °C), Min:97 °F (36.1 °C), Max:98.8 °F (37.1 °C)    Weight:   Wt Readings from Last 3 Encounters:   21 119 lb (54 kg)   21 121 lb (54.9 kg)   20 128 lb 4 oz (58.2 kg)     I/O last 3 completed shifts:  I/O last 3 completed shifts:   In: 959 [P.O.:380; I.V.:579]  Out: 1110 [Urine:1100; Blood:10]     Recent Labs     21  1946 21  0648 21  1226 21  1711   POCGLU 114* 96 110* 142*         General appearance - alert, well appearing, and in no acute distress  Mental status - oriented to person, place, and time with normal affect  Head - normocephalic and atraumatic  Eyes - pupils equal and reactive, extraocular eye movements intact, conjunctiva clear  Ears - hearing appears to be intact  Nose - no drainage noted  Mouth - mucous membranes moist  Neck - supple, no carotid bruits, thyroid not palpable  Chest - clear to auscultation, normal effort  Heart - normal rate, regular rhythm, no murmur  Abdomen - soft, nontender, nondistended, bowel sounds present all four quadrants, no masses, hepatomegaly or splenomegaly  Neurological - normal speech, no focal findings or movement disorder noted, cranial nerves II through XII grossly intact  Extremities - peripheral pulses palpable, no pedal edema or calf pain with palpation  Skin - no gross lesions, rashes, or induration noted        Medications: Allergies:    Allergies   Allergen Reactions    Ambien [Zolpidem]     Bactrim [Sulfamethoxazole-Trimethoprim]        Current Meds:   Current Facility-Administered Medications:     amiodarone (CORDARONE) tablet 100 mg, 100 mg, Oral, Daily, Emmie Frazier MD, 100 mg at 09/21/21 1002    levothyroxine (SYNTHROID) tablet 25 mcg, 25 mcg, Oral, Daily, Emmie Frazier MD    timolol (TIMOPTIC) 0.5 % ophthalmic solution 1 drop, 1 drop, Both Eyes, Daily, Emmie Frazier MD, 1 drop at 09/21/21 1758    0.9 % sodium chloride infusion, 25 mL, IntraVENous, PRN, Emmie Frazier MD    ondansetron (ZOFRAN-ODT) disintegrating tablet 4 mg, 4 mg, Oral, Q8H PRN **OR** ondansetron (ZOFRAN) injection 4 mg, 4 mg, IntraVENous, Q6H PRN, Emmie Frazier MD    polyethylene glycol (GLYCOLAX) packet 17 g, 17 g, Oral, Daily PRN, Emmie Frazier MD    acetaminophen (TYLENOL) tablet 650 mg, 650 mg, Oral, Q6H PRN, 650 mg at 09/21/21 1750 **OR** acetaminophen (TYLENOL) suppository 650 mg, 650 mg, Rectal, Q6H PRN, Emmie Frazier MD    traMADol (ULTRAM) tablet 50 mg, 50 mg, Oral, Q6H PRN, Emmie Frazier MD    lactated ringers infusion, , IntraVENous, Continuous, Emmie Frazier MD, Stopped at 09/21/21 1330    sodium chloride flush 0.9 % injection 5-40 mL, 5-40 mL, IntraVENous, 2 times per day, Emmie Frazier MD    sodium chloride flush 0.9 % injection 5-40 mL, 5-40 mL, IntraVENous, PRN, Emmie Frazier MD    0.9 % sodium chloride infusion, 25 mL, IntraVENous, PRN, Emmie Frazier MD    cyclobenzaprine (FLEXERIL) tablet 10 mg, 10 mg, Oral, TID PRN, Emmie Frazier MD    aspirin chewable tablet 81 mg, 81 mg, Oral, Daily, Emmie Frazier MD, 81 mg at 09/20/21 0905    carvedilol (COREG) tablet 6.25 mg, 6.25 mg, Oral, BID WC, Emmie Frazier MD, 6.25 mg at 09/21/21 1753    ferrous sulfate (FE TABS 325) EC tablet 325 mg, 325 mg, Oral, Daily with breakfast, Andres Hammond MD, 325 mg at 09/20/21 0905    magnesium oxide (MAG-OX) tablet 400 mg, 400 mg, Oral, Daily, Andres Hammond MD, 400 mg at 09/20/21 3352    potassium chloride (KLOR-CON M) extended release tablet 20 mEq, 20 mEq, Oral, Daily with breakfast, Andres Hammond MD, 20 mEq at 09/20/21 0905    glucose (GLUTOSE) 40 % oral gel 15 g, 15 g, Oral, PRN, Andres Hammond MD    dextrose 50 % IV solution, 12.5 g, IntraVENous, PRN, Andres Hammond MD    glucagon (rDNA) injection 1 mg, 1 mg, IntraMUSCular, PRN, Andres Hammond MD    dextrose 5 % solution, 100 mL/hr, IntraVENous, PRN, Andres Hammond MD    insulin lispro (HUMALOG) injection vial 0-12 Units, 0-12 Units, SubCUTAneous, TID WC, Andres Hammond MD, 2 Units at 09/21/21 1750    insulin lispro (HUMALOG) injection vial 0-6 Units, 0-6 Units, SubCUTAneous, Nightly, Andres Hammond MD    torsemide [facility]) tablet 40 mg, 40 mg, Oral, Daily, Andres Hammond MD, 40 mg at 09/21/21 1758    ALPRAZolam (XANAX) tablet 0.25 mg, 0.25 mg, Oral, TID PRN, Andres Hammond MD, 0.25 mg at 09/20/21 3494      I/O (24Hr):     Intake/Output Summary (Last 24 hours) at 9/21/2021 1805  Last data filed at 9/21/2021 1713  Gross per 24 hour   Intake 959 ml   Output 1510 ml   Net -551 ml       Data:           Labs:    Hematology:  Recent Labs     09/19/21 2224 09/19/21 2224 09/20/21  0606 09/20/21  0606 09/20/21  1934 09/20/21  2324 09/21/21  0408   WBC 7.5  --  6.6  --   --   --  6.9   RBC 2.93*  --  2.77*  --   --   --  2.77*   HGB 8.9*  --  8.5*  --   --   --  8.5*   HCT 27.5*  --  25.9*  --   --   --  26.0*   MCV 93.9  --  93.5  --   --   --  93.9   MCH 30.4  --  30.7  --   --   --  30.7   MCHC 32.4  --  32.8  --   --   --  32.7   RDW 13.0  --  12.8  --   --   --  12.7     --  282  --   --   --  304   MPV 8.5  --  8.4  --   --   --  8.7   INR 1.9   < > 2.0   < > 1.7 1.5 1.5   DDIMER 2.41*  -- --   --   --   --   --     < > = values in this interval not displayed. Chemistry:  Recent Labs     09/19/21  2145 09/20/21  0035 09/20/21  0606 09/21/21  0408   *  --  135 136   K 4.4  --  3.9 3.6*   CL 91*  --  92* 95*   CO2 28  --  33* 31   GLUCOSE 170*  --  99 98   BUN 30*  --  28* 20   CREATININE 1.54*  --  1.50* 1.32*   MG 1.6  --   --   --    ANIONGAP 15  --  10 10   LABGLOM 32*  --  33* 39*   GFRAA 39*  --  40* 47*   CALCIUM 9.4  --  9.3 9.0   PROBNP 2,037*  --  2,965*  --    TROPHS 19* 21*  --   --      Recent Labs     09/20/21  0305 09/20/21  0606 09/20/21  1143 09/20/21  1624 09/20/21  1946 09/21/21  0648 09/21/21  1226 09/21/21  1711   TSH  --  6.82*  --   --   --   --   --   --    POCGLU   < >  --  142* 147* 114* 96 110* 142*    < > = values in this interval not displayed. Lab Results   Component Value Date/Time    SPECIAL NOT REPORTED 09/19/2021 10:05 PM     Lab Results   Component Value Date/Time    CULTURE NO SIGNIFICANT GROWTH 09/19/2021 10:05 PM       No results found for: POCPH, PHART, PH, POCPCO2, WBA2PNK, PCO2, POCPO2, PO2ART, PO2, POCHCO3, FBK7QZP, HCO3, NBEA, PBEA, BEART, BE, THGBART, THB, HZV4BPW, MKDM2UHW, U3QHIUMQ, O2SAT, FIO2    Radiology:    XR CHEST PORTABLE    Result Date: 9/19/2021  There is a moderate size left pleural effusion with probable infiltrate at the left base. This appears stable compared to a partial visualization on thoracic spine imaging September 6, 2021. All radiological studies reviewed  Code Status:  Full Code        Electronically signed by Inderjit Lackey MD on 9/21/2021 at 6:05 PM    This note was created with the assistance of a speech-recognition program.  Although the intention is to generate a document that actually reflects the content of the visit, no guarantees can be provided that every mistake has been identified and corrected by editing.      Note was updated later by me after  physical examination and  completion of the assessment.

## 2021-09-21 NOTE — PROGRESS NOTES
Physical Therapy  DATE: 2021    NAME: Tierra Scruggs  MRN: [id number]   : 1940    Patient not seen this date for Physical Therapy due to:  [] Blood transfusion in progress  [] Cancel by RN  [] Hemodialysis  []  Refusal by Patient   [] Spine Precautions   [] Strict Bedrest  [x] Surgery  [] Testing      [] Other        [] PT being discontinued at this time. Patient independent. No further needs. [] PT being discontinued at this time as the patient has been transferred to hospice care. No further needs.     [address] [facility] [address], PT

## 2021-09-21 NOTE — PROGRESS NOTES
Dr. Adeline Soto updated on patient's plan for surgery tomorrow, that she received vitamin K for INR 2.0 and her INR recheck was 1.7.  Dr. Adeline Soto stated to recheck INR at 5 am.

## 2021-09-22 ENCOUNTER — APPOINTMENT (OUTPATIENT)
Dept: GENERAL RADIOLOGY | Age: 81
DRG: 981 | End: 2021-09-22
Payer: COMMERCIAL

## 2021-09-22 LAB
ABSOLUTE EOS #: 0.3 K/UL (ref 0–0.44)
ABSOLUTE IMMATURE GRANULOCYTE: 0.03 K/UL (ref 0–0.3)
ABSOLUTE LYMPH #: 1.27 K/UL (ref 1.1–3.7)
ABSOLUTE MONO #: 0.82 K/UL (ref 0.1–1.2)
ANION GAP SERPL CALCULATED.3IONS-SCNC: 10 MMOL/L (ref 9–17)
BASOPHILS # BLD: 1 % (ref 0–2)
BASOPHILS ABSOLUTE: 0.05 K/UL (ref 0–0.2)
BNP INTERPRETATION: ABNORMAL
BUN BLDV-MCNC: 15 MG/DL (ref 8–23)
BUN/CREAT BLD: 12 (ref 9–20)
CALCIUM SERPL-MCNC: 8.8 MG/DL (ref 8.6–10.4)
CHLORIDE BLD-SCNC: 97 MMOL/L (ref 98–107)
CHOLESTEROL/HDL RATIO: 3.5
CHOLESTEROL: 144 MG/DL
CO2: 30 MMOL/L (ref 20–31)
CREAT SERPL-MCNC: 1.3 MG/DL (ref 0.5–0.9)
DIFFERENTIAL TYPE: ABNORMAL
EOSINOPHILS RELATIVE PERCENT: 4 % (ref 1–4)
GFR AFRICAN AMERICAN: 48 ML/MIN
GFR NON-AFRICAN AMERICAN: 39 ML/MIN
GFR SERPL CREATININE-BSD FRML MDRD: ABNORMAL ML/MIN/{1.73_M2}
GFR SERPL CREATININE-BSD FRML MDRD: ABNORMAL ML/MIN/{1.73_M2}
GLUCOSE BLD-MCNC: 113 MG/DL (ref 70–99)
GLUCOSE BLD-MCNC: 123 MG/DL (ref 65–105)
GLUCOSE BLD-MCNC: 239 MG/DL (ref 65–105)
GLUCOSE BLD-MCNC: 261 MG/DL (ref 65–105)
GLUCOSE BLD-MCNC: 84 MG/DL (ref 65–105)
HCT VFR BLD CALC: 26.7 % (ref 36.3–47.1)
HDLC SERPL-MCNC: 41 MG/DL
HEMOGLOBIN: 8.8 G/DL (ref 11.9–15.1)
IMMATURE GRANULOCYTES: 0 %
INR BLD: 1.3
LDL CHOLESTEROL: 82 MG/DL (ref 0–130)
LYMPHOCYTES # BLD: 18 % (ref 24–43)
MAGNESIUM: 1.7 MG/DL (ref 1.6–2.6)
MCH RBC QN AUTO: 30.8 PG (ref 25.2–33.5)
MCHC RBC AUTO-ENTMCNC: 33 G/DL (ref 28.4–34.8)
MCV RBC AUTO: 93.4 FL (ref 82.6–102.9)
MONOCYTES # BLD: 11 % (ref 3–12)
NRBC AUTOMATED: 0 PER 100 WBC
PDW BLD-RTO: 12.9 % (ref 11.8–14.4)
PLATELET # BLD: 274 K/UL (ref 138–453)
PLATELET ESTIMATE: ABNORMAL
PMV BLD AUTO: 8.4 FL (ref 8.1–13.5)
POTASSIUM SERPL-SCNC: 3.4 MMOL/L (ref 3.7–5.3)
PRO-BNP: 3129 PG/ML
PROTHROMBIN TIME: 15.8 SEC (ref 11.5–14.2)
RBC # BLD: 2.86 M/UL (ref 3.95–5.11)
RBC # BLD: ABNORMAL 10*6/UL
SEG NEUTROPHILS: 66 % (ref 36–65)
SEGMENTED NEUTROPHILS ABSOLUTE COUNT: 4.78 K/UL (ref 1.5–8.1)
SODIUM BLD-SCNC: 137 MMOL/L (ref 135–144)
TRIGL SERPL-MCNC: 103 MG/DL
VLDLC SERPL CALC-MCNC: NORMAL MG/DL (ref 1–30)
WBC # BLD: 7.3 K/UL (ref 3.5–11.3)
WBC # BLD: ABNORMAL 10*3/UL

## 2021-09-22 PROCEDURE — 85025 COMPLETE CBC W/AUTO DIFF WBC: CPT

## 2021-09-22 PROCEDURE — 71046 X-RAY EXAM CHEST 2 VIEWS: CPT

## 2021-09-22 PROCEDURE — 83880 ASSAY OF NATRIURETIC PEPTIDE: CPT

## 2021-09-22 PROCEDURE — 82947 ASSAY GLUCOSE BLOOD QUANT: CPT

## 2021-09-22 PROCEDURE — 2580000003 HC RX 258: Performed by: ORTHOPAEDIC SURGERY

## 2021-09-22 PROCEDURE — 80061 LIPID PANEL: CPT

## 2021-09-22 PROCEDURE — 97530 THERAPEUTIC ACTIVITIES: CPT

## 2021-09-22 PROCEDURE — 85610 PROTHROMBIN TIME: CPT

## 2021-09-22 PROCEDURE — 97535 SELF CARE MNGMENT TRAINING: CPT

## 2021-09-22 PROCEDURE — 80048 BASIC METABOLIC PNL TOTAL CA: CPT

## 2021-09-22 PROCEDURE — 97162 PT EVAL MOD COMPLEX 30 MIN: CPT

## 2021-09-22 PROCEDURE — 6370000000 HC RX 637 (ALT 250 FOR IP): Performed by: FAMILY MEDICINE

## 2021-09-22 PROCEDURE — 83735 ASSAY OF MAGNESIUM: CPT

## 2021-09-22 PROCEDURE — 97110 THERAPEUTIC EXERCISES: CPT

## 2021-09-22 PROCEDURE — 97167 OT EVAL HIGH COMPLEX 60 MIN: CPT

## 2021-09-22 PROCEDURE — 97116 GAIT TRAINING THERAPY: CPT

## 2021-09-22 PROCEDURE — 1200000000 HC SEMI PRIVATE

## 2021-09-22 PROCEDURE — 99213 OFFICE O/P EST LOW 20 MIN: CPT

## 2021-09-22 PROCEDURE — 36415 COLL VENOUS BLD VENIPUNCTURE: CPT

## 2021-09-22 PROCEDURE — 6370000000 HC RX 637 (ALT 250 FOR IP): Performed by: ORTHOPAEDIC SURGERY

## 2021-09-22 RX ORDER — WARFARIN SODIUM 5 MG/1
5 TABLET ORAL
Status: COMPLETED | OUTPATIENT
Start: 2021-09-22 | End: 2021-09-22

## 2021-09-22 RX ORDER — LIDOCAINE 4 G/G
1 PATCH TOPICAL DAILY
Status: DISCONTINUED | OUTPATIENT
Start: 2021-09-22 | End: 2021-09-23 | Stop reason: HOSPADM

## 2021-09-22 RX ADMIN — ACETAMINOPHEN 650 MG: 325 TABLET ORAL at 17:08

## 2021-09-22 RX ADMIN — WARFARIN SODIUM 5 MG: 5 TABLET ORAL at 17:09

## 2021-09-22 RX ADMIN — TORSEMIDE 40 MG: 20 TABLET ORAL at 09:15

## 2021-09-22 RX ADMIN — CYCLOBENZAPRINE 10 MG: 10 TABLET, FILM COATED ORAL at 09:15

## 2021-09-22 RX ADMIN — AMIODARONE HYDROCHLORIDE 100 MG: 200 TABLET ORAL at 09:16

## 2021-09-22 RX ADMIN — INSULIN LISPRO 3 UNITS: 100 INJECTION, SOLUTION INTRAVENOUS; SUBCUTANEOUS at 21:12

## 2021-09-22 RX ADMIN — INSULIN LISPRO 4 UNITS: 100 INJECTION, SOLUTION INTRAVENOUS; SUBCUTANEOUS at 13:22

## 2021-09-22 RX ADMIN — ACETAMINOPHEN 650 MG: 325 TABLET ORAL at 00:03

## 2021-09-22 RX ADMIN — COLLAGENASE SANTYL: 250 OINTMENT TOPICAL at 14:11

## 2021-09-22 RX ADMIN — CARVEDILOL 6.25 MG: 6.25 TABLET, FILM COATED ORAL at 17:09

## 2021-09-22 RX ADMIN — CARVEDILOL 6.25 MG: 6.25 TABLET, FILM COATED ORAL at 09:16

## 2021-09-22 RX ADMIN — ACETAMINOPHEN 650 MG: 325 TABLET ORAL at 06:10

## 2021-09-22 RX ADMIN — TRAMADOL HYDROCHLORIDE 50 MG: 50 TABLET, FILM COATED ORAL at 21:40

## 2021-09-22 RX ADMIN — LEVOTHYROXINE SODIUM 25 MCG: 25 TABLET ORAL at 06:10

## 2021-09-22 RX ADMIN — SODIUM CHLORIDE, PRESERVATIVE FREE 10 ML: 5 INJECTION INTRAVENOUS at 09:25

## 2021-09-22 RX ADMIN — SODIUM CHLORIDE, PRESERVATIVE FREE 10 ML: 5 INJECTION INTRAVENOUS at 21:20

## 2021-09-22 RX ADMIN — TRAMADOL HYDROCHLORIDE 50 MG: 50 TABLET, FILM COATED ORAL at 07:54

## 2021-09-22 RX ADMIN — CYCLOBENZAPRINE 10 MG: 10 TABLET, FILM COATED ORAL at 17:08

## 2021-09-22 RX ADMIN — TIMOLOL MALEATE 1 DROP: 5 SOLUTION OPHTHALMIC at 09:17

## 2021-09-22 RX ADMIN — FERROUS SULFATE TAB EC 325 MG (65 MG FE EQUIVALENT) 325 MG: 325 (65 FE) TABLET DELAYED RESPONSE at 09:16

## 2021-09-22 RX ADMIN — TRAMADOL HYDROCHLORIDE 50 MG: 50 TABLET, FILM COATED ORAL at 14:51

## 2021-09-22 RX ADMIN — POTASSIUM CHLORIDE 20 MEQ: 20 TABLET, EXTENDED RELEASE ORAL at 09:16

## 2021-09-22 RX ADMIN — MAGNESIUM OXIDE TAB 400 MG (241.3 MG ELEMENTAL MG) 400 MG: 400 (241.3 MG) TAB at 09:16

## 2021-09-22 RX ADMIN — ASPIRIN 81 MG: 81 TABLET, CHEWABLE ORAL at 09:16

## 2021-09-22 ASSESSMENT — PAIN SCALES - GENERAL
PAINLEVEL_OUTOF10: 9
PAINLEVEL_OUTOF10: 10
PAINLEVEL_OUTOF10: 9
PAINLEVEL_OUTOF10: 10

## 2021-09-22 ASSESSMENT — PAIN DESCRIPTION - PAIN TYPE
TYPE: SURGICAL PAIN

## 2021-09-22 ASSESSMENT — PAIN DESCRIPTION - ONSET
ONSET: ON-GOING
ONSET: ON-GOING

## 2021-09-22 ASSESSMENT — PAIN DESCRIPTION - LOCATION
LOCATION: BACK

## 2021-09-22 ASSESSMENT — PAIN DESCRIPTION - DIRECTION: RADIATING_TOWARDS: BUTTOCK

## 2021-09-22 ASSESSMENT — PAIN DESCRIPTION - PROGRESSION
CLINICAL_PROGRESSION: NOT CHANGED
CLINICAL_PROGRESSION: NOT CHANGED

## 2021-09-22 ASSESSMENT — PAIN - FUNCTIONAL ASSESSMENT
PAIN_FUNCTIONAL_ASSESSMENT: PREVENTS OR INTERFERES SOME ACTIVE ACTIVITIES AND ADLS
PAIN_FUNCTIONAL_ASSESSMENT: PREVENTS OR INTERFERES SOME ACTIVE ACTIVITIES AND ADLS

## 2021-09-22 ASSESSMENT — PAIN DESCRIPTION - DESCRIPTORS
DESCRIPTORS: OTHER (COMMENT)
DESCRIPTORS: CONSTANT;SHARP

## 2021-09-22 ASSESSMENT — PAIN DESCRIPTION - ORIENTATION: ORIENTATION: MID

## 2021-09-22 ASSESSMENT — PAIN DESCRIPTION - FREQUENCY
FREQUENCY: CONTINUOUS
FREQUENCY: CONTINUOUS

## 2021-09-22 NOTE — PROGRESS NOTES
[facility]    Patient Name:  Javi Atwood    [facility]:[birth year]       SUBJECTIVE:  Patient sitting up in the chair. She states that she is having back pain. She denies any chest pain or dyspnea. VS: BP (!) 107/42   Pulse 70   Temp 97.5 °F (36.4 °C) (Oral)   Resp 18   Ht 5' 1\" (1.549 m)   Wt 127 lb (57.6 kg)   SpO2 97%   BMI 24.00 kg/m²   Wt: Weight change:   I/O: I/O last 3 completed shifts: In: 579 [I.V.:579]  Out: 2110 [Urine:2100; Blood:10]  I/O this shift:  In: -   Out: 175 [Urine:175]  Monitor: SR    Meds:  Scheduled Meds:   warfarin  5 mg Oral Once    collagenase   Topical Daily    amiodarone  100 mg Oral Daily    levothyroxine  25 mcg Oral Daily    timolol  1 drop Both Eyes Daily    sodium chloride flush  5-40 mL IntraVENous 2 times per day    aspirin  81 mg Oral Daily    carvedilol  6.25 mg Oral BID WC    ferrous sulfate  325 mg Oral Daily with breakfast    magnesium oxide  400 mg Oral Daily    potassium chloride  20 mEq Oral Daily with breakfast    insulin lispro  0-12 Units SubCUTAneous TID WC    insulin lispro  0-6 Units SubCUTAneous Nightly    torsemide  40 mg Oral Daily      Continuous Infusions:   sodium chloride      sodium chloride      dextrose       PRN Meds: sodium chloride, ondansetron **OR** ondansetron, polyethylene glycol, acetaminophen **OR** acetaminophen, traMADol, sodium chloride flush, sodium chloride, cyclobenzaprine, glucose, dextrose, glucagon (rDNA), dextrose, ALPRAZolam     Physical Exam:    General Appearance: NAD, A&Ox3. Skin: Pink, warm and dry. Pulmonary/Chest: CTA. Cardiovascular: S1S2, RRR, negative JVD. Extremities: Trivial peripheral edema.     Labs:    CBC with Differential:    Lab Results   Component Value Date    WBC 7.3 09/22/2021    RBC 2.86 09/22/2021    HGB 8.8 09/22/2021    HCT 26.7 09/22/2021     09/22/2021    MCV 93.4 09/22/2021    MCH 30.8 09/22/2021    MCHC 33.0 09/22/2021    RDW 12.9 09/22/2021   [  BMP:     Lab Results   Component Value Date     09/22/2021    K 3.4 09/22/2021    CL 97 09/22/2021    CO2 30 09/22/2021    BUN 15 09/22/2021    CREATININE 1.30 09/22/2021     MG/PHOS:    Lab Results   Component Value Date    MG 1.7 09/22/2021     PT/INR:    Lab Results   Component Value Date    INR 1.3 09/22/2021     Lipid Panel:    Lab Results   Component Value Date    CHOL 144 09/22/2021    TRIG 103 09/22/2021    HDL 41 09/22/2021     CXR:  Impression   Persistent left pleural effusion.  No definite evidence to suggest infiltrate   at this time. Assessment/Plan:    Active Problems:    Acute on chronic diastolic CHF (congestive heart failure) (HCC)    Pathological fracture of lumbar vertebra due to secondary osteoporosis (HCC)    Intractable back pain    Age-related osteoporosis with current pathological fracture    Bilateral primary osteoarthritis of hip    DDD (degenerative disc disease), lumbar  Resolved Problems:    * No resolved hospital problems. *     1. CAD  -Stable and angina free. On ASA and B. Blocker. Intolerant to Statins.      2. HTN  -BP improved.     3. CHF/Pleural effusion  -Secondary to chronic LV diastolic dysfunction, renal insufficiency, and anemia. No plans for thoracentesis. On Demadex 40mg daily. CXR as above.     4. Paroxysmal atrial fibrillation  -S/P Maze. In SR. On Amiodarone and B. Blocker. AC with Coumadin, managed per Pharmacy.      5. Hx AVR, MVR, TV repair  -Stable.      6. Mixed hyperlipidemia  -Intolerant to Statins. Patient declines other medications.      7. Anemia  -On Ferrous Sulfate. Iron low from chronic disease.     8. Lumbar fracture  -Ortho following. S/P Kyphoplasty. I will review with Dr. Philippe Lauren.     Electronically signed by AMBROCIO Dee CNP on 9/22/2021 at 2:30 PM

## 2021-09-22 NOTE — PLAN OF CARE
Problem: Falls - Risk of:  Goal: Will remain free from falls  Description: Will remain free from falls  9/22/2021 0637 by Babatunde Quintana RN  Outcome: Ongoing  Note: Siderails up x 2  Hourly rounding  Call light in reach  Instructed to call for assist before attempting out of bed. Remains free from falls and accidental injury at this time   Floor free from obstacles  Bed is locked and in lowest position  Adequate lighting provided  Bed alarm on, Red Falling star and Stay with Me signs posted         Problem: Falls - Risk of:  Goal: Absence of physical injury  Description: Absence of physical injury  9/22/2021 0637 by Babatunde Quintana RN  Outcome: Ongoing     Problem: Activity Intolerance:  Goal: Ability to tolerate increased activity will improve  Description: Ability to tolerate increased activity will improve  9/22/2021 0637 by Babatunde Quintana RN  Outcome: Ongoing     Problem: Cardiac Output - Decreased:  Goal: Hemodynamic stability will improve  Description: Hemodynamic stability will improve  9/22/2021 0637 by Babatunde Quintana RN  Outcome: Ongoing     Problem: Fluid Volume - Excess:  Goal: Control of fluid volume excess will improve  Description: Control of fluid volume excess will improve  9/22/2021 [id number] by Babatunde Quintana RN  Outcome: Ongoing     Problem: Venous Thromboembolism:  Goal: Will show no signs or symptoms of venous thromboembolism  Description: Will show no signs or symptoms of venous thromboembolism  9/22/2021 7438 by Babatunde Quintana RN  Outcome: Ongoing     Problem: Venous Thromboembolism:  Goal: Absence of signs or symptoms of impaired coagulation  Description: Absence of signs or symptoms of impaired coagulation  9/22/2021 0637 by Babatunde Quintana RN  Outcome: Ongoing     Problem: Pain:  Goal: Pain level will decrease  Description: Pain level will decrease  9/22/2021 0637 by Babatunde Quintana RN  Outcome: Ongoing  Note: Pain level assessment complete.    Patient educated on pain scale and control

## 2021-09-22 NOTE — PROGRESS NOTES
[facility]        9/22/2021   4:23 PM    Name:  Austin Pepe  MRN:    [id number]     [place]:     [de-identified]   Room:  2006/2006-02  IP Day: 2     Admit Date: 9/19/2021  9:35 PM  PCP: Luis Antonio Moeller MD    C/C: Back pain    Assessment:      Chest pain  Acute on chronic diastolic congestive heart failure  Pleural effusion secondary to above  Chronic coronary disease  Paroxysmal atrial fibrillation  History of AVR, MVR and TV repair  Acute kidney injury  L1 compression fracture, osteoporotic  Back pain secondary to above  Iron deficiency anemia  Hyperlipidemia, intolerant to statins  Acquired hypothyroidism  Essential hypertension  Depression with anxiety        Plan:      Patient is admitted to MedSur unit  Oxygen keep SPO2 more than 90%  Telemetry  Check worsening closely  CBC BMP daily  Pain control  Continue amiodarone, carvedilol  Continue aspirin  Continue levothyroxine  Cardiology following  Patient evaluated by orthopedic, patient is status post L1 kyphoplasty with biopsy on 9/21/2021  Persistent pleural effusion, consult pulmonology  Patient continues to complain about intractable back pain does not want to take narcotics as tells me that she becomes constipated from it, patient on tramadol will add lidocaine patch  Continue other medication as below    Scheduled Meds:   warfarin  5 mg Oral Once    collagenase   Topical Daily    lidocaine  1 patch TransDERmal Daily    amiodarone  100 mg Oral Daily    levothyroxine  25 mcg Oral Daily    timolol  1 drop Both Eyes Daily    sodium chloride flush  5-40 mL IntraVENous 2 times per day    aspirin  81 mg Oral Daily    carvedilol  6.25 mg Oral BID WC    ferrous sulfate  325 mg Oral Daily with breakfast    magnesium oxide  400 mg Oral Daily    potassium chloride  20 mEq Oral Daily with breakfast    insulin lispro  0-12 Units SubCUTAneous TID WC    insulin lispro  0-6 Units SubCUTAneous Nightly    torsemide  40 mg Oral Daily Continuous Infusions:   sodium chloride      sodium chloride      dextrose       PRN Meds:  sodium chloride, 25 mL, PRN  ondansetron, 4 mg, Q8H PRN   Or  ondansetron, 4 mg, Q6H PRN  polyethylene glycol, 17 g, Daily PRN  acetaminophen, 650 mg, Q6H PRN   Or  acetaminophen, 650 mg, Q6H PRN  traMADol, 50 mg, Q6H PRN  sodium chloride flush, 5-40 mL, PRN  sodium chloride, 25 mL, PRN  cyclobenzaprine, 10 mg, TID PRN  glucose, 15 g, PRN  dextrose, 12.5 g, PRN  glucagon (rDNA), 1 mg, PRN  dextrose, 100 mL/hr, PRN  ALPRAZolam, 0.25 mg, TID PRN            Subjective:   Patient seen and examined at bedside and reviewed  last 24 hrs events with nursing staff  No acute events overnight. Patient denies any acute complaints. Afebrile  Patient denies any chest pain, shortness of Breath, palpitation, headache, dizziness, cough, nausea, vomiting, abdominal pain, pain, changes in urination or bowel habit or rash. ROS:  A 10 point system reviewed and negative otherwise mentioned above.         Physical Examination:      Vitals:  BP (!) 134/54   Pulse 67   Temp 98.1 °F (36.7 °C) (Oral)   Resp 17   Ht 5' 1\" (1.549 m)   Wt 127 lb (57.6 kg)   SpO2 98%   BMI 24.00 kg/m²   Temp (24hrs), Av.3 °F (36.8 °C), Min:97.5 °F (36.4 °C), Max:99 °F (37.2 °C)    Weight:   Wt Readings from Last 3 Encounters:   21 127 lb (57.6 kg)   21 121 lb (54.9 kg)   20 128 lb 4 oz (58.2 kg)     I/O last 3 completed shifts:  I/O last 3 completed shifts:  In: -   Out: 2175 [Urine:2175]     Recent Labs     21  1711 21  2020 21  0621 21  1139   POCGLU 142* 177* 123* 239*         General appearance - alert, well appearing, and in no acute distress  Mental status - oriented to person, place, and time with normal affect  Head - normocephalic and atraumatic  Eyes - pupils equal and reactive, extraocular eye movements intact, conjunctiva clear  Ears - hearing appears to be intact  Nose - no drainage noted  Mouth - mucous membranes moist  Neck - supple, no carotid bruits, thyroid not palpable  Chest - clear to auscultation, normal effort  Heart - normal rate, regular rhythm, no murmur  Abdomen - soft, nontender, nondistended, bowel sounds present all four quadrants, no masses, hepatomegaly or splenomegaly  Neurological - normal speech, no focal findings or movement disorder noted, cranial nerves II through XII grossly intact  Extremities - peripheral pulses palpable, no pedal edema or calf pain with palpation  Skin - no gross lesions, rashes, or induration noted        Medications: Allergies:    Allergies   Allergen Reactions    Ambien [Zolpidem]     Bactrim [Sulfamethoxazole-Trimethoprim]        Current Meds:   Current Facility-Administered Medications:     warfarin (COUMADIN) tablet 5 mg, 5 mg, Oral, Once, Andres Hammond MD    collagenase ointment, , Topical, Daily, Lamar Garcia MD, Given at 09/22/21 1411    lidocaine 4 % external patch 1 patch, 1 patch, TransDERmal, Daily, Mary Francois MD    amiodarone (CORDARONE) tablet 100 mg, 100 mg, Oral, Daily, Andres Hammond MD, 100 mg at 09/22/21 0916    levothyroxine (SYNTHROID) tablet 25 mcg, 25 mcg, Oral, Daily, Andres Hammond MD, 25 mcg at 09/22/21 0610    timolol (TIMOPTIC) 0.5 % ophthalmic solution 1 drop, 1 drop, Both Eyes, Daily, Andres Hammond MD, 1 drop at 09/22/21 0917    0.9 % sodium chloride infusion, 25 mL, IntraVENous, PRN, Andres Hammond MD    ondansetron (ZOFRAN-ODT) disintegrating tablet 4 mg, 4 mg, Oral, Q8H PRN **OR** ondansetron (ZOFRAN) injection 4 mg, 4 mg, IntraVENous, Q6H PRN, Andres Hammond MD    polyethylene glycol (GLYCOLAX) packet 17 g, 17 g, Oral, Daily PRN, Andres Hammond MD    acetaminophen (TYLENOL) tablet 650 mg, 650 mg, Oral, Q6H PRN, 650 mg at 09/22/21 0610 **OR** acetaminophen (TYLENOL) suppository 650 mg, 650 mg, Rectal, Q6H PRN, Andres Hammond MD    traMADol (ULTRAM) tablet 50 mg, 50 mg, Oral, Q6H PRN, Dayami Mari, MD, 50 mg at 09/22/21 1451    sodium chloride flush 0.9 % injection 5-40 mL, 5-40 mL, IntraVENous, 2 times per day, Anuj Aguilar MD, 10 mL at 09/22/21 0925    sodium chloride flush 0.9 % injection 5-40 mL, 5-40 mL, IntraVENous, PRN, Anuj Aguilar MD    0.9 % sodium chloride infusion, 25 mL, IntraVENous, PRN, Anuj Aguilar MD    cyclobenzaprine (FLEXERIL) tablet 10 mg, 10 mg, Oral, TID PRN, Anuj Aguilar MD, 10 mg at 09/22/21 0915    aspirin chewable tablet 81 mg, 81 mg, Oral, Daily, Anuj Aguilar MD, 81 mg at 09/22/21 0916    carvedilol (COREG) tablet 6.25 mg, 6.25 mg, Oral, BID WC, Anuj Aguilar MD, 6.25 mg at 09/22/21 2534    ferrous sulfate (FE TABS 325) EC tablet 325 mg, 325 mg, Oral, Daily with breakfast, Anuj Aguilar MD, 325 mg at 09/22/21 0916    magnesium oxide (MAG-OX) tablet 400 mg, 400 mg, Oral, Daily, Anuj Aguilar MD, 400 mg at 09/22/21 4140    potassium chloride (KLOR-CON M) extended release tablet 20 mEq, 20 mEq, Oral, Daily with breakfast, Anuj Aguilar MD, 20 mEq at 09/22/21 0916    glucose (GLUTOSE) 40 % oral gel 15 g, 15 g, Oral, PRN, Anuj Aguilar MD    dextrose 50 % IV solution, 12.5 g, IntraVENous, PRN, Anuj Aguilar MD    glucagon (rDNA) injection 1 mg, 1 mg, IntraMUSCular, PRN, Anuj Aguilar MD    dextrose 5 % solution, 100 mL/hr, IntraVENous, PRN, Anuj Aguilar MD    insulin lispro (HUMALOG) injection vial 0-12 Units, 0-12 Units, SubCUTAneous, TID WC, Anuj Aguilar MD, 4 Units at 09/22/21 1322    insulin lispro (HUMALOG) injection vial 0-6 Units, 0-6 Units, SubCUTAneous, Nightly, Anuj Aguilar MD, 1 Units at 09/21/21 2100    torsemide (DEMADEX) tablet 40 mg, 40 mg, Oral, Daily, Anuj Aguilar MD, 40 mg at 09/22/21 0915    ALPRAZolam (XANAX) tablet 0.25 mg, 0.25 mg, Oral, TID PRN, Anuj Aguilar MD, 0.25 mg at 09/20/21 9454      I/O (24Hr):     Intake/Output Summary (Last 24 hours) at 9/22/2021 1623  Last data filed at 9/22/2021 1436  Gross per 24 hour   Intake --   Output POCGLU  --    < > 96 110* 142* 177*  --  123* 239*    < > = values in this interval not displayed. Lab Results   Component Value Date/Time    SPECIAL NOT REPORTED 09/19/2021 10:05 PM     Lab Results   Component Value Date/Time    CULTURE NO SIGNIFICANT GROWTH 09/19/2021 10:05 PM       No results found for: POCPH, PHART, PH, POCPCO2, MZS1WBI, PCO2, POCPO2, PO2ART, PO2, POCHCO3, NLO2IPN, HCO3, NBEA, PBEA, BEART, BE, THGBART, THB, DLY1ZOA, WPRI7ERR, F5PBNSFA, O2SAT, FIO2    Radiology:    XR CHEST PORTABLE    Result Date: 9/19/2021  There is a moderate size left pleural effusion with probable infiltrate at the left base. This appears stable compared to a partial visualization on thoracic spine imaging September 6, 2021. All radiological studies reviewed  Code Status:  Full Code        Electronically signed by Nik Chapa MD on 9/22/2021 at 4:23 PM    This note was created with the assistance of a speech-recognition program.  Although the intention is to generate a document that actually reflects the content of the visit, no guarantees can be provided that every mistake has been identified and corrected by editing. Note was updated later by me after  physical examination and  completion of the assessment.

## 2021-09-22 NOTE — PROGRESS NOTES
Comprehensive Nutrition Assessment    Type and Reason for Visit:  Consult (Diet education)    Nutrition Recommendations/Plan:   1. Continue Regular diet  2. Start Ensure Enlive 1x/day  3. Monitor p.o intakes and labs    Nutrition Assessment:  Patient reports her appetite is good but she does not really like the hospital food. Patient is eating about 50% or more at meals. Nutrition consult received for diet education. Patient is on a Regular diet. No known diet education needs. Patient has stage 2 pressure ulcer to mid back. Start Ensure [employer] 1x/day. Monitor p.o intakes and labs. Malnutrition Assessment:  Malnutrition Status:  Insufficient data      Estimated Daily Nutrient Needs:  Energy (kcal):  8324-4921 kcal (25-28 kcal/kg); Weight Used for Energy Requirements:  Current     Protein (g):  75-81 gm of protein (1.3-1.4 gm/kg); Weight Used for Protein Requirements:  Current          Nutrition Related Findings:  Ededma: +1 BLE. Stage 2 pressure ulcer on mid back      Wounds:  Stage II       Current Nutrition Therapies:    ADULT DIET; Regular    Anthropometric Measures:  · Height: 5' 1\" (154.9 cm)  · Current Body Weight: 127 lb (57.6 kg)   · Admission Body Weight:      · Usual Body Weight: 121 lb (54.9 kg)     · Ideal Body Weight: 105 lbs; % Ideal Body Weight 121 %   · BMI: 24   · BMI Categories: Normal Weight (BMI 18.5-24. 9)       Nutrition Diagnosis:   · Increased nutrient needs related to increase demand for energy/nutrients as evidenced by other (comment) (lumbar fractures), pressure ulcer      Nutrition Interventions:   Food and/or Nutrient Delivery:  Continue Current Diet, Start Oral Nutrition Supplement  Nutrition Education/Counseling:  Education not indicated   Coordination of Nutrition Care:  Continue to monitor while inpatient    Goals:  PO intakes are greater than 50% at meals       Nutrition Monitoring and Evaluation:   Food/Nutrient Intake Outcomes:  Food and Nutrient Intake, Supplement Intake  Physical Signs/Symptoms Outcomes:  Biochemical Data, Fluid Status or Edema, Skin, Weight     Discharge Planning:    Continue current diet, Continue Oral Nutrition Supplement       Gideon RAMIREZN, RDN, LDN  Lead Clinical Dietitian  RD Office Phone [phone]

## 2021-09-22 NOTE — PROGRESS NOTES
Post Operative Progress Note    9/22/2021 6:29 AM  Subjective:   Admit Date: 9/19/2021  PCP: Octavio Summers MD  Date of Discharge: As per Dr. Vern Matthew    Medications:   Scheduled Meds:   amiodarone  100 mg Oral Daily    levothyroxine  25 mcg Oral Daily    timolol  1 drop Both Eyes Daily    sodium chloride flush  5-40 mL IntraVENous 2 times per day    aspirin  81 mg Oral Daily    carvedilol  6.25 mg Oral BID WC    ferrous sulfate  325 mg Oral Daily with breakfast    magnesium oxide  400 mg Oral Daily    potassium chloride  20 mEq Oral Daily with breakfast    insulin lispro  0-12 Units SubCUTAneous TID WC    insulin lispro  0-6 Units SubCUTAneous Nightly    torsemide  40 mg Oral Daily     Continuous Infusions:   sodium chloride      sodium chloride      dextrose       PRN Meds:sodium chloride, ondansetron **OR** ondansetron, polyethylene glycol, acetaminophen **OR** acetaminophen, traMADol, sodium chloride flush, sodium chloride, cyclobenzaprine, glucose, dextrose, glucagon (rDNA), dextrose, ALPRAZolam    Diet:   Diet: ADULT DIET; Regular    Subjective:   Systemic or Specific Complaints:Pain Control    Objective:     Patient Vitals for the past 24 hrs:   BP Temp Temp src Pulse Resp SpO2 Weight   09/22/21 0411 -- -- -- -- -- -- 127 lb (57.6 kg)   09/22/21 0353 (!) 112/48 98.9 °F (37.2 °C) Oral 70 16 96 % --   09/22/21 0012 (!) 121/49 98.1 °F (36.7 °C) Oral 75 16 94 % --   09/21/21 1918 (!) 112/40 99 °F (37.2 °C) Oral 71 16 96 % --   09/21/21 1524 (!) 137/48 97.9 °F (36.6 °C) Oral 65 16 95 % --   09/21/21 1311 (!) 148/52 97.8 °F (36.6 °C) Oral 67 16 97 % --   09/21/21 1300 (!) 152/57 97 °F (36.1 °C) Temporal 62 14 97 % --   09/21/21 1245 (!) 153/53 -- -- 59 12 98 % --   09/21/21 1230 (!) 153/52 -- -- 58 24 100 % --   09/21/21 1220 (!) 157/54 97.5 °F (36.4 °C) Temporal 58 12 100 % --   09/21/21 0803 (!) 155/53 97.7 °F (36.5 °C) Oral 71 16 96 % 119 lb (54 kg)     I/O last 3 completed shifts:   In: 257 [P.O.:200; I.V.:579]  Out: 1610 [Urine:1600; Blood:10]  I/O this shift:  In: -   Out: 1100 [Urine:1100]      General: alert, appears stated age and cooperative   Wound: Wound Intact   Motion: Painful range of Motion in affected extremity   DVT Exam: No evidence of DVT seen on physical exam.  Negative Rusty's sign. No cords or calf tenderness. Additional exam:     Data Review  CBC:   Recent Labs     09/20/21  0606 09/21/21  0408 09/22/21  0535   WBC 6.6 6.9 7.3   HGB 8.5* 8.5* 8.8*    304 274     BMP:    Recent Labs     09/20/21  0606 09/21/21  0408 09/22/21  0535    136 137   K 3.9 3.6* 3.4*   CL 92* 95* 97*   CO2 33* 31 30   BUN 28* 20 15   CREATININE 1.50* 1.32* 1.30*   GLUCOSE 99 98 113*     Hepatic: No results for input(s): AST, ALT, ALB, BILITOT, ALKPHOS in the last 72 hours. Troponin: No results for input(s): TROPONINI in the last 72 hours. BNP: No results for input(s): BNP in the last 72 hours. Lipids: No results for input(s): CHOL, HDL in the last 72 hours. Invalid input(s): LDLCALCU  INR:   Recent Labs     09/20/21  2324 09/21/21  0408 09/22/21  0535   INR 1.5 1.5 1.3         Assessment:   Laila Calderon has improved from yesterday. Pain is well controlled. She has no nausea and no vomiting. Current activity is ad deisi  Incision: intact      Plan:      1: Spine suture removal, Steri-Strip  2:  Continue Deep venous thrombosis prophylaxis  3:  Continue Pain Control  4. Therapy. Walker ambulation no spine flexion, no spine lifting for 6 post fracture weeks  5. Okay to discharge orthopedically.   Office follow-up 1 month    Electronically signed by Wen Salinas MD on 9/22/2021 at 6:29 AM

## 2021-09-22 NOTE — CARE COORDINATION
Social Work- spoke with family. Family is requesting a referral to [facility] for rehab.  Sent referral. Janusz Agudelo

## 2021-09-22 NOTE — PLAN OF CARE
Nutrition Problem #1: Increased nutrient needs  Intervention: Food and/or Nutrient Delivery: Continue Current Diet, Start Oral Nutrition Supplement  Nutritional Goals: PO intakes are greater than 50% at meals

## 2021-09-22 NOTE — PROGRESS NOTES
Physical Therapy    [facility]  Initial Assessment    NAME: Luba Luong  : 1940  MRN: [id number]    Date of Service: 2021    Discharge Recommendations:  Patient would benefit from continued therapy after discharge       Pt presented to ED on 21 with episode of chest pain. Pt has significant history of open heart surgery as well as chronic congestive heart failure on warfarin. States she had episode of chest pain lasting approximately 10 to 20 minutes while seated watching TV. Transported via EMS who gave nitro as well as aspirin. States pain is largely resolved. Chest pain was intermittent. Sharp. Substernal.  Relieved with nitro and aspirin did have a recent fall few weeks prior leading to L1 compression fracture. She states that this pain is worsening however that her chest pain has much improved. Denying significant shortness of breath. Denies any weight gain however has noticed that her bilateral lower extremities have been more swollen. Pt  had a direct buttock trauma fall several weeks ago. Intractable mechanical lumbar pain since. 2021 thoracolumbar films noted osteoporotic compression fracture deformity L1 acute possible 4. Degenerative changes with DDD and bilateral hip arthritis. Office follow-up discussed treatment options including TLSO, kyphoplasty vertebral augmentation, biopsy. Surgery would decrease pain, decrease kyphotic deformity, eliminate the need for spine orthosis, obtain tissue diagnosis. She was scheduled for this. Diabetes mellitus, Hypertension, and Tachycardia  On , pt taken to OR for:    1. Kyphoplasty vertebral augmentation of pathological L1 compression  fracture. 2.  Anesthesia by surgeon - 54 modifier. 3.  Regional Marcaine lumbar block (03563). 4.  Biopsy, pathological L1 compression fracture. 5.  AP and lateral fluoroscopy of lumbar spine secondary pathological osteoporotic L1 compression  Fracture.     RN reports patient is medically stable for therapy treatment this date. Chart reviewed prior to treatment and patient is agreeable for therapy. Assessment   Assessment: Pt with deficits of transfers, ambulation, balance, POOR safety awareness and endurance this session,  & required 2 assist for & is decline compared to prior level of function. With current deficits, Pt HIGH risk for falls & requires continued PT to maximize independence with functional mobility, balance, safety awareness & activity tolerance. Pt currently functioning below baseline. Would suggest additional therapy at time of discharge to maximize long term outcomes and prevent re-admission. Please refer to AM-PAC score for current level of function. Prognosis: Excellent  Decision Making: Medium Complexity  Exam: ROM, MMT, functional mobility, activity tolerance, Balance, & [employer] AM-PAC 6 Clicks Basic Mobility  Clinical Presentation: evolving  PT Education: Goals;PT Role;Plan of Care; Functional Mobility Training;Transfer Training;Gait Training;Energy Conservation;General Safety;Precautions  Patient Education: Ed pt on functional mobility, safety awareness, spinal precautions, importance of being up & OOB to regain strength, & prevention of sedentary complications, circulation ex's,  & use of incentive spirometer including technique, frequency and purpose, optimal breathing techniques  REQUIRES PT FOLLOW UP: Yes  Activity Tolerance  Activity Tolerance: Patient limited by fatigue;Patient limited by pain; Patient limited by endurance       Patient Diagnosis(es): The primary encounter diagnosis was Clinical systolic heart failure, unspecified heart failure chronicity ([address] Diagnoses of Pleural effusion and BAM (acute kidney injury) ([address] were also pertinent to this visit. has a past medical history of Diabetes mellitus ([address] Hypertension, and Tachycardia. has a past surgical history that includes Hysterectomy;  Abdomen surgery; Breast surgery; and Spine surgery (N/A, 9/21/2021). 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; pt states she has had previous B eye cataract sx that was botched)  Vision Exceptions: Wears glasses at all times  Hearing: Exceptions to [facility]  Hearing Exceptions: Hard of hearing/hearing concerns     Subjective  General  Chart Reviewed: Yes  Patient assessed for rehabilitation services?: Yes  Additional Pertinent Hx: DM, HTN, s/p open heart surgery 12/20  Response To Previous Treatment: Not applicable  General Comment  Comments: RN okays PT  Subjective  Subjective: Pt agreeable to PT  Pain Screening  Patient Currently in Pain: Yes  Pain Assessment  Pain Assessment: 0-10 (9/10)  Pain Type: Surgical pain  Pain Location: Back       Orientation  Orientation  Overall Orientation Status: Within Normal Limits  Social/Functional History  Social/Functional History  Lives With: Daughter, Family (& Son in law.   Pt reports daughter is retired.)  Type of Home: House  Home Layout: Laundry in basement, Two level, Able to Live on Main level with bedroom/bathroom (full bath on main; 13 with left ascending rail to upstairs, 12 steps down to basement with R descending rail)  Home Access: Stairs to enter without rails  Entrance Stairs - Number of Steps: 1+1 from garage(daughter alwayw with her)  Bathroom Shower/Tub: Tub/Shower unit, Walk-in shower (WIS on main)  Bathroom Toilet: Handicap height  Bathroom Equipment: Shower chair, Grab bars around toilet  Home Equipment: 4 wheeled walker, [place], [address] Help From: Family (family is supportive)  ADL Assistance: Independent  Homemaking Assistance: Needs assistance (pt states her family completes all IADLS)  Homemaking Responsibilities: No  Ambulation Assistance: Independent  Transfer Assistance: Independent  Active : No  Patient's  Info: family  Occupation: Retired  Type of occupation:   Leisure & Hobbies: pool in [facility], very active, staying neat & organized  Additional Comments: Pt fell on 9/6 and she stated she tripped over dog  Cognition   Cognition  Overall Cognitive Status: Exceptions  Arousal/Alertness: Delayed responses to stimuli  Following Commands: Follows one step commands with increased time; Follows one step commands with repetition  Attention Span: Attends with cues to redirect; Difficulty attending to directions; Difficulty dividing attention  Memory: Decreased short term memory;Decreased recall of recent events  Safety Judgement: Decreased awareness of need for assistance;Decreased awareness of need for safety  Problem Solving: Assistance required to correct errors made;Assistance required to implement solutions;Assistance required to identify errors made;Assistance required to generate solutions;Decreased awareness of errors  Insights: Not aware of deficits  Initiation: Requires cues for all  Sequencing: Requires cues for some    Objective     Observation/Palpation  Observation: LUE IV, bruises on BUE's  Edema: mild edema in LE's  Scar: back incision and dressing; previous open heart scar    AROM RLE (degrees)  RLE AROM: WFL  AROM LLE (degrees)  LLE AROM : WFL  AROM RUE (degrees)  RUE General AROM: see OT assessment  AROM LUE (degrees)  LUE General AROM: see OT assessment  Strength RLE  Comment: 3+/5 hip/knee, 4/5 ankle  Strength RUE  Comment: see OT assessment  Tone RLE  RLE Tone: Normotonic  Tone LLE  LLE Tone: Normotonic  Coordination  Movements Are Fluid And Coordinated: Yes  Motor Control  Gross Motor?: WFL  Sensation  Overall Sensation Status: Impaired (constant paresthesias in hands & intermittent in her feet)  Bed mobility  Supine to Sit: Unable to assess  Sit to Supine: Unable to assess  Comment: Unable to assess 2* pt sitting EOB with RN upon arrival & pt agreed to sit up in chair after edu on the benefits of being up OOB as able  Transfers  Sit to Stand: Minimal Assistance  Stand to sit: Moderate Assistance  Bed to Chair: Moderate assistance  Stand Pivot Transfers: Moderate Assistance  Lateral Transfers: Moderate Assistance  Comment: Ed + tactile assist on correct use of upper body for safe sit/stand + to back all way back to surface with walker close before she reaches from arms of walker to chair  Ambulation  Ambulation?: Yes  More Ambulation?: Yes  Ambulation 1  Surface: level tile  Device: Rolling Walker  Assistance: Minimal assistance  Quality of Gait: step to pattern, safety cues to keep walker close at all times & to amb inside base of walker & upright posture  Gait Deviations: Decreased step length;Decreased step height  Distance: 10ft  Ambulation 2  Surface - 2: level tile  Device 2: Rolling Walker  Quality of Gait 2: step to pattern, safety cues to keep walker close at all times & to amb inside base of walker & upright posture  Distance: 25ft     Balance  Posture: Fair  Sitting - Static: Good  Sitting - Dynamic: Good  Standing - Static: Fair;+ (R/W)  Standing - Dynamic: Fair (R/W)  Exercises  Comments: Ed spinal precautions & how to complete bed mobility with spinal precautions, circulation ex's & deep breathing ex's using incentive spirometer including technique, frequency and purpose   All lines intact, call light within reach, and patient positioned comfortably at end of treatment. All patient needs addressed prior to ending therapy session.     Return from [id number] for Pt ed of Deep breathing ex's w/ incentive spirometer for 10 reps, & Ed to perform hourly while, Ed spinal precautions, bed mobility within spinal precautions,   Energy conservation & breathing techniques: Educated patient on pursed lip breathing to increase control of breathing. Initiated by breathing through the nose and blowing out with pursed lip to increase O2 into lungs, & on the importance of diaphragmatic breathing techique. Pt return demonstrated 2 sets of 10 cycles of breath with focused 5-7 seconds out and 4 seconds in. All lines intact, call light within reach, and patient positioned comfortably at end of treatment. All patient needs addressed prior to ending therapy session. Plan   Plan  Times per week: 1-2x/D, 5-6D/week  Current Treatment Recommendations: Strengthening, Balance Training, Functional Mobility Training, Transfer Training, ADL/Self-care Training, IADL Training, Gait Training, Endurance Training, Neuromuscular Re-education, Home Exercise Program, Safety Education & Training, Patient/Caregiver Education & Training  Safety Devices  Type of devices: Bed alarm in place, Call light within reach, Chair alarm in place, Gait belt, Patient at risk for falls, Left in chair, Nurse notified    G-Code       OutComes Score                        AM-PAC Score  AM-PAC Inpatient Mobility Raw Score : 13 (09/22/21 0849)  AM-PAC Inpatient T-Scale Score : 36.74 (09/22/21 0849)  Mobility Inpatient CMS 0-100% Score: 64.91 (09/22/21 0849)  Mobility Inpatient CMS G-Code Modifier : CL (09/22/21 0849)          Goals  Short term goals  Time Frame for Short term goals: 12 visits  Short term goal 1: Inc bed-mobility & transfers to independent to enable pt to safely get in/OOB & chair  Short term goal 2: Inc gait to amb 350ft or > indep w/ RW to enable pt to return to previous level of independence  Short term goal 3:  Inc strength to [facility] & standing balance to good with device to facilitate pt independence for performance of ADL's & functional mobility, & reduce fall risk  Short term goal 4: Pt able to tolerate 30-40 min of activity to include 15-20 reps of ex, NMR & functional mobility including 5 minutes of standing with device to facilitate activity tolerance to [facility]  Short term goal 5: Ed pt on home ex's, spinal precautions, safety & energy principles, safety & energy principles, fall prevention, & issue written home program       Therapy Time   Individual Concurrent Group Co-treatment   Time In 8083/3136         Time Out 0852/1502         Minutes 54+10+27=91              Additional 10 minutes for chart review    Treatment time: 27+27=54 minutes      TAYLOR RIBERA, PT

## 2021-09-22 NOTE — CARE COORDINATION
Social work: spoke to Eitan Sandoval in admissions at [facility] they may have some discharges tomorrow. Faxed referral and called to discuss with admissions. Will need stuart, Rx and Mi PAs if they have a bed.   Calli quezadaw

## 2021-09-22 NOTE — CARE COORDINATION
Therapy recommending SNF. Spoke to pt and dtr Penny villegas regarding choices. Requesting Pee and Isaias Malone informed and will send referral.  Dtr does not want [address] [place] [address] due to past experience. MELA initiated.

## 2021-09-22 NOTE — PROGRESS NOTES
Occupational Therapy   Occupational Therapy Initial Assessment  Date: 2021   Patient Name: Masoud Ariza  MRN: [id number]     : 1940    RN Angel Potter reports patient is medically stable for therapy treatment this date. Chart reviewed prior to treatment and patient is agreeable for therapy. All lines intact and patient positioned comfortably at end of treatment. All patient needs addressed prior to ending therapy session. Pt currently functioning below baseline. Would suggest additional therapy at time of discharge to maximize long term outcomes and prevent re-admission. Please refer to AM-PAC score for current level of function. Date of Service: 2021    Discharge Recommendations:  Patient would benefit from continued therapy after discharge  OT Equipment Recommendations  Equipment Needed: Yes  Mobility Devices: ADL Assistive Devices  ADL Assistive Devices: Toileting - 3-in-1 Commode;Reacher;Grab Bars - shower;Sock-Aid Soft;Long-handled Shoe Horn;Long-handled Sponge;Emergency Alert System    Assessment   Performance deficits / Impairments: Decreased functional mobility ; Decreased safe awareness;Decreased balance;Decreased coordination;Decreased ADL status; Decreased endurance;Decreased high-level IADLs;Decreased posture;Decreased vision/visual deficit; Decreased cognition  Assessment: Skilled OT is needed for teach and train of AE use to increase I and safety/adhere to back precautions with self care as well as improve balance/act skye to reduce falls as able to return home with assist.  Prognosis: Fair  Decision Making: High Complexity  OT Education: Precautions;OT Role;Plan of Care;Energy Conservation;Transfer Training  Patient Education: pursed lip breathing, edema mgt tech, safety in function, recommendations for continued therapy and benefits of being up OOB as able, back prec, postural control, call light use/fall prevention  Barriers to Learning: memory issues  REQUIRES OT FOLLOW UP: Yes  Activity Tolerance  Activity Tolerance: Patient limited by fatigue;Patient limited by pain;Treatment limited secondary to decreased cognition  Activity Tolerance: fair minus  Safety Devices  Safety Devices in place: Yes  Type of devices: Call light within reach; Chair alarm in place; Left in chair;Patient at risk for falls;Gait belt;Nurse notified (BLE's elevated on stool/pillow under and pt was edu to elevate and complete B ankle AROM/pumping as able to decrease swelling. Pt with good understanding.)           Patient Diagnosis(es): The primary encounter diagnosis was Clinical systolic heart failure, unspecified heart failure chronicity ([address] Diagnoses of Pleural effusion and BAM (acute kidney injury) ([address] were also pertinent to this visit. has a past medical history of Diabetes mellitus ([address] Hypertension, and Tachycardia. has a past surgical history that includes Hysterectomy; Abdomen surgery; Breast surgery; and Spine surgery (N/A, 9/21/2021). PER H&P: Miesha Alcantara is a 78 y.o.  female who presents with No chief complaint on file.     Pt admitted through the ED where she presented with c/o chest pain. Pt says pain has been intermittent with episodes of chest pain lasting ten to twenty minutes. Pain usually occurred at rest. Pain was sharp, moderate, localized over the precordium, non radiating and not associated with nausea, vomiting or diaphoresis.  Pt denies having any palpitations, fever, chills, cough or wheezing associated with the chest pain.      *Per chart pt is s/p:    Date of Procedure: 9/21/2021     Pre-Op Diagnosis: DX COMPRESSION FX; pathological osteoporotic L1 compression fracture     Post-Op Diagnosis: Same and Healed pathological osteoporotic L4 compression fracture       Procedure(s):  L1 KYPHOPLASTY WITH BIOPSY CARMS; biopsy pathological osteoporotic L1 compression fracture; regional Marcaine lumbar block; kyphoplasty vertebral augmentation pathological L1 compression fracture    Restrictions  Restrictions/Precautions  Restrictions/Precautions: General Precautions, Fall Risk  Position Activity Restriction  Spinal Precautions: No Bending, No Lifting, No Twisting  Other position/activity restrictions: Activity as tolerated, ambulate pt, Up with assist, KENANE IV, s/p kyphoplasty on 9/21, back precautions, alarms    Subjective   General  Chart Reviewed: Yes  Patient assessed for rehabilitation services?: Yes  Family / Caregiver Present: No  *pt states she has 10/10 back pain and RN is aware. Social/Functional History  Social/Functional History  Lives With: Daughter, Family (& Son in law.   Pt reports daughter is retired/supportive and able to assist.)  Type of Home: House  Home Layout: Laundry in basement, Two level, Able to Live on Main level with bedroom/bathroom (full bath on main; 13 with left ascending rail to upstairs, 12 steps down to basement with R descending rail)  Home Access: Stairs to enter without rails  Entrance Stairs - Number of Steps: 1+1 from garage(pt states her daughter always with her)  Bathroom Shower/Tub: Tub/Shower unit, Walk-in shower (WIS on main)  Bathroom Toilet: Handicap height  Bathroom Equipment: Shower chair, Grab bars around toilet  Home Equipment: 4 wheeled walker, Cane, [address] Help From: Family (family is supportive)  ADL Assistance: Independent  Homemaking Assistance: Needs assistance (pt states her family completes all IADLS)  Homemaking Responsibilities: No  Ambulation Assistance: Independent  Transfer Assistance: Independent  Active : No  Patient's  Info: family  Occupation: Retired  Type of occupation:   Leisure & Hobbies: pool in [place], very active, staying neat & organized  Additional Comments: Pt fell on 9/6 and she stated she tripped over dog       Objective   Vision: Impaired (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; pt states she has had previous B eye cataract sx that was botched)  Vision Exceptions: Wears glasses at all times  Hearing: Exceptions to [facility]  Hearing Exceptions: Hard of hearing/hearing concerns    Orientation  Overall Orientation Status: Within Functional Limits  Observation/Palpation  Posture: Fair (kyphotic posture)  Observation: LUE IV, bruises on BUE's  Edema: mild edema in LE's  Scar: back incision and dressing; previous open heart scar  Balance  Sitting Balance: Stand by assistance  Standing Balance: Minimal assistance  Standing Balance  Time: stand skye 3-4 mins with RW  Functional Mobility  Functional - Mobility Device: Rolling Walker  Activity:  (bed to toilet, toilet to sink, sink to door and back to bedside chair)  Assist Level: Minimal assistance  Functional Mobility Comments: MOD verbal instruction for upright posture, RW safety/mgt and staying inside AD, scanning room, pacing, and awareness/assist with lines to increase safety/reduce falls. Toilet Transfers  Toilet - Technique: Ambulating  Equipment Used: Grab bars  Toilet Transfer: Minimal assistance  Toilet Transfers Comments: MAX verbal instruction/tactile assist for hand placement on grab bar, squaring self/AD up to surface prior to sitting and controlled as well as awareness/assist with lines to increase overall safety. ADL  Feeding: Setup  Grooming: Setup;Contact guard assistance (to stand at sink for oral care and washing B hands; verbal instruction needed for proper RW position to increase safety)  UE Bathing: Setup;Stand by assistance  LE Bathing: Setup; Moderate assistance  UE Dressing: Setup;Minimal assistance (with hosp gown)  LE Dressing: Setup;Maximum assistance (with socks and B slippers)  Toileting: Setup;Minimal assistance (with clothing/gown mgt; pt urinated and needed CG for hygiene seated)  Tone RUE  RUE Tone: Normotonic  Tone LUE  LUE Tone: Normotonic  Coordination  Movements Are Fluid And Coordinated: Yes  Coordination and Movement description: Fine motor impairments     Bed mobility  Supine to Sit: Unable to assess (pt sitting EOB with RN upon arrival)  Sit to Supine: Unable to assess (pt agreed to sit up in chair after edu on the benefits of being up OOB as able)  Transfers  Stand Step Transfers: Minimal assistance (with RW)  Sit to stand: Minimal assistance  Stand to sit: Minimal assistance  Transfer Comments: MAX verbal instruction/tactile assist for B hand placement pushing from surface seated on as well as reaching back, pacing, scanning, RW safety/mgt, squaring self/AD up to surface prior to sitting and controlled as well as awareness/assist with lines to increase overall safety. Cognition  Overall Cognitive Status: Exceptions  Arousal/Alertness: Delayed responses to stimuli  Following Commands: Follows one step commands with increased time; Follows one step commands with repetition  Attention Span: Attends with cues to redirect; Difficulty attending to directions; Difficulty dividing attention  Memory: Decreased short term memory;Decreased recall of recent events  Safety Judgement: Decreased awareness of need for assistance;Decreased awareness of need for safety  Problem Solving: Assistance required to correct errors made;Assistance required to implement solutions;Assistance required to identify errors made;Assistance required to generate solutions;Decreased awareness of errors  Insights: Not aware of deficits  Initiation: Requires cues for all  Sequencing: Requires cues for some  Perception  Overall Perceptual Status: WFL     Sensation  Overall Sensation Status: Impaired (pt c/o intermittent in her feet; denies for hands)        BUE AROM WFL's     BUE Strength  BUE Strength Comment: N/T as pt had kypho sx                   Plan   Plan  Times per week: 4-5x/week 1-2x/day as skye  Current Treatment Recommendations: Balance Training, Functional Mobility Training, ROM, Safety Education & Training, Positioning, Pain Management, Home Management Training, Cognitive/Perceptual Training, Endurance Training, Neuromuscular Re-education, Patient/Caregiver Education & Training, Self-Care / ADL, Equipment Evaluation, Education, & procurement                                                  AM-PAC Score   17          Goals  Short term goals  Time Frame for Short term goals: by discharge, pt to demo  Short term goal 1: bed mob tasks with use of rail as needed to SBA. Short term goal 2: I with back precautions and BUE ROM HEP to increase safety/reduce pain and maintain functional use for self care. Short term goal 3: UB ADL to set up and LB ADL to min assist with use of AD/AE as needed. Short term goal 4: toileting tasks with use of grab bar/AD as needed SBA. Short term goal 5: ADL transfers and functional mob with AD to SBA level. Long term goals  Long term goal 1: Pt/family to be with EC/WS and fall prevention tech as well as DME/AE and AD recommendations with use of handouts. Long term goal 2: Pt to stand with SUP and AD skye > 8 mins as able to reduce falls in function. Patient Goals   Patient goals : Pt states she would like to get back home!        Therapy Time   Individual Concurrent Group Co-treatment   Time In 8690 (plus 10 min chart review/nursing communication)         Time Out 0852         Minutes 54         Timed Code Treatment Minutes: [place] , [place]

## 2021-09-22 NOTE — PROGRESS NOTES
Warfarin Dosing - Pharmacy Consult Note  Consulting Provider: Camden Russ Indication:  Mechanical Heart Valve (mitral), afib  Warfarin Dose prior to admission: 1mg t/th, 2mg rest of week. Concurrent anticoagulants/antiplatelets: none   Significant Drug Interactions: amiodarone (incr INR). ,levothyroxine, tramadol   Recent Labs     09/20/21  0606 09/20/21  1934 09/20/21  2324 09/21/21  0408 09/22/21  0535   INR 2.0   < > 1.5 1.5 1.3   HGB 8.5*  --   --  8.5* 8.8*     --   --  304 274    < > = values in this interval not displayed. Recent warfarin administrations                     warfarin (COUMADIN) tablet 2 mg (mg) 2 mg Given 09/21/21 1750                   Date   INR    Dose  9/22        1.3       2mg on 9/21    Assessment/Plan  (Goal INR: 2.5 - 3.5)  To bump up inr more , give coumadin 5mg today. Inr in am.     Active problem list reviewed. INR orders are placed. Chart reviewed for pertinent labs, drug/diet interactions, and past doses. Documentation of patient's clinical condition was reviewed. Pharmacy Dosing:  Pharmacy will continue to follow.

## 2021-09-22 NOTE — CONSULTS
X-ray chest is showing moderate sized left pleural effusion which has been stable since partially visualized on September 6 2021 x-ray of her thoracic spine. She is not requiring any oxygen. Is a former smoker, quit 50+ years ago with a 5-pack-year history. She had asthma in her early childhood with no problems as an adult. She reports she has had a thoracentesis x4 for this left effusion since her open heart surgery in December 2020. Last thoracentesis according to her daughter was while she was hospitalized on August 18. She follows with Dr. Pati Oppenheim.    PMHx   Past Medical History      Diagnosis Date    Diabetes mellitus ([facility]     Hypertension     Tachycardia       Past Surgical History        Procedure Laterality Date    ABDOMEN SURGERY      BREAST SURGERY      HYSTERECTOMY      SPINE SURGERY N/A 9/21/2021    L1 KYPHOPLASTY WITH BIOPSY CARMS performed by Leslie Deshpande MD at [address] [place] [address]    Current Medications    warfarin  5 mg Oral Once    collagenase   Topical Daily    lidocaine  1 patch TransDERmal Daily    amiodarone  100 mg Oral Daily    levothyroxine  25 mcg Oral Daily    timolol  1 drop Both Eyes Daily    sodium chloride flush  5-40 mL IntraVENous 2 times per day    aspirin  81 mg Oral Daily    carvedilol  6.25 mg Oral BID     ferrous sulfate  325 mg Oral Daily with breakfast    magnesium oxide  400 mg Oral Daily    potassium chloride  20 mEq Oral Daily with breakfast    insulin lispro  0-12 Units SubCUTAneous TID WC    insulin lispro  0-6 Units SubCUTAneous Nightly    torsemide  40 mg Oral Daily     sodium chloride, ondansetron **OR** ondansetron, polyethylene glycol, acetaminophen **OR** acetaminophen, traMADol, sodium chloride flush, sodium chloride, cyclobenzaprine, glucose, dextrose, glucagon (rDNA), dextrose, ALPRAZolam  IV Drips/Infusions   sodium chloride      sodium chloride      dextrose       Home Medications  Medications Prior to Admission: levothyroxine (SYNTHROID) 25 MCG tablet, Take 25 mcg by mouth daily  MAGNESIUM PO, Take 400 mg by mouth daily  timolol (TIMOPTIC) 0.5 % ophthalmic solution, timolol maleate 0.5 % eye drops  torsemide (DEMADEX) 20 MG tablet, 1 tablet daily in the afternoon another tablet every other day  warfarin (COUMADIN) 2 MG tablet, TAKE 1 TABLET BY MOUTH EVERY DAY  amiodarone (CORDARONE) 200 MG tablet, TAKE 1 TABLET BY MOUTH EVERY DAY  aspirin 81 MG EC tablet, Take 81 mg by mouth  carvedilol (COREG) 6.25 MG tablet, TAKE 1 TABLET BY MOUTH TWICE A DAY WITH FOOD  ferrous sulfate (IRON 325) 325 (65 Fe) MG tablet, Take 325 mg by mouth  potassium chloride (KLOR-CON M) 20 MEQ extended release tablet, Take 20 mEq by mouth daily  metFORMIN (GLUCOPHAGE) 500 MG tablet, Take 500 mg by mouth 2 times daily (with meals)  metoprolol tartrate (LOPRESSOR) 25 MG tablet, Take 25 mg by mouth daily  mirabegron (MYRBETRIQ) 25 MG TB24, Take 25 mg by mouth daily    Allergies    Ambien [zolpidem] and Bactrim [sulfamethoxazole-trimethoprim]  Social History     Social History     Tobacco Use    Smoking status: Never Smoker    Smokeless tobacco: Never Used   Substance Use Topics    Alcohol use: Never     Family History    History reviewed. No pertinent family history. ROS - 11 systems   General Denies any fever or chills  HEENT Denies any diplopia, tinnitus or vertigo  Resp Denies any shortness of breath, cough or wheezing  Cardiac Denies any chest pain, palpitations, claudication or edema  GI Denies any melena, hematochezia, hematemesis or pyrosis   Denies any frequency, urgency, hesitancy or incontinence  Heme Denies bruising or bleeding easily  Endocrine Denies any history of diabetes or thyroid disease  Neuro Denies any focal motor or sensory deficits  Psychiatric Denies anxiety, depression, suicidal ideation  Skin Denies rashes, itching, open sores    Vitals     height is 5' 1\" (1.549 m) and weight is 127 lb (57.6 kg).  Her oral temperature is 98.1 °F (36.7 °C). Her blood pressure is 134/54 (abnormal) and her pulse is 67. Her respiration is 17 and oxygen saturation is 98%. Body mass index is 24 kg/m². I/O        Intake/Output Summary (Last 24 hours) at 9/22/2021 1632  Last data filed at 9/22/2021 1436  Gross per 24 hour   Intake --   Output 2175 ml   Net -2175 ml     I/O last 3 completed shifts:  In: -   Out: 2175 [Urine:2175]   Patient Vitals for the past 96 hrs (Last 3 readings):   Weight   09/22/21 0411 127 lb (57.6 kg)   09/21/21 0803 119 lb (54 kg)   09/19/21 2140 120 lb (54.4 kg)     Exam   General Appearance  Awake, alert, oriented, in no acute distress  HEENT - Head is normocephalic, atraumatic. Pupil reactive to light  Neck - Supple,  trachea midline and straight  Lungs -diminished left base, no crackles or wheezing  Cardiovascular - Heart sounds are normal.  Regular rhythm normal rate without murmur, gallop or rub. Abdomen - Soft, nontender, nondistended, no masses or organomegaly  Neurologic - CN II-XII are grossly intact. There are no focal motor or sensory deficits  Skin - No bruising or bleeding  Extremities - No cyanosis, clubbing.   Mild edema bilateral lower extremities  Labs  - Old records and notes have been reviewed in [facility]   CBC     Lab Results   Component Value Date    WBC 7.3 09/22/2021    RBC 2.86 09/22/2021    HGB 8.8 09/22/2021    HCT 26.7 09/22/2021     09/22/2021    MCV 93.4 09/22/2021    MCH 30.8 09/22/2021    MCHC 33.0 09/22/2021    RDW 12.9 09/22/2021    LYMPHOPCT 18 09/22/2021    MONOPCT 11 09/22/2021    BASOPCT 1 09/22/2021    MONOSABS 0.82 09/22/2021    LYMPHSABS 1.27 09/22/2021    EOSABS 0.30 09/22/2021    BASOSABS 0.05 09/22/2021    DIFFTYPE NOT REPORTED 09/22/2021     BMP   Lab Results   Component Value Date     09/22/2021    K 3.4 09/22/2021    CL 97 09/22/2021    CO2 30 09/22/2021    BUN 15 09/22/2021    CREATININE 1.30 09/22/2021    GLUCOSE 113 09/22/2021    CALCIUM 8.8 09/22/2021    MG 1.7 09/22/2021     INR

## 2021-09-22 NOTE — CONSULTS
[address] [address] Continence Nurse  Consult Note       NAME:  Ji Quezada  MEDICAL RECORD NUMBER:  [id number]  AGE: 78 y.o. GENDER: female  : 1940  TODAY'S DATE:  2021    Subjective:     Reason for Wound Cyndi Coonover Care and Assessment: mid back wound between surgical incisions     Ji Quezada is a 78 y.o. female referred by:   [x] Physician  [] Nursing  [] Other:       Wound Identification:  Wound Type: pressure  Contributing Factors: chronic pressure, decreased mobility and shear force    Wound History: - present on admission per RN and patient's daughter at bedside  Current Wound Care Treatment:  Open to air on eval    Patient Goal of Care:  [x] Wound Healing  [] Odor Control  [] Palliative Care  [] Pain Control   [] Other:         PAST MEDICAL HISTORY        Diagnosis Date    Diabetes mellitus ([facility] [address]     Hypertension     Tachycardia        PAST SURGICAL HISTORY    Past Surgical History:   Procedure Laterality Date    ABDOMEN SURGERY      BREAST SURGERY      HYSTERECTOMY      SPINE SURGERY N/A 2021    L1 KYPHOPLASTY WITH BIOPSY CARMS performed by Preston Rome MD at [address] [place] [address]    History reviewed. No pertinent family history.     SOCIAL HISTORY    Social History     Tobacco Use    Smoking status: Never Smoker    Smokeless tobacco: Never Used   Substance Use Topics    Alcohol use: Never    Drug use: Never       ALLERGIES    Allergies   Allergen Reactions    Ambien [Zolpidem]     Bactrim [Sulfamethoxazole-Trimethoprim]            Objective:      BP (!) 107/42   Pulse 70   Temp 97.5 °F (36.4 °C) (Oral)   Resp 18   Ht 5' 1\" (1.549 m)   Wt 127 lb (57.6 kg)   SpO2 97%   BMI 24.00 kg/m²       LABS    CBC:   Lab Results   Component Value Date    WBC 7.3 2021    RBC 2.86 2021    HGB 8.8 2021     CMP:  Albumin:  No results found for: LABALBU  PT/INR:    Lab Results   Component Value Date    PROTIME 15.8 2021    INR 1.3 2021     HgBA1c:  No results found for: LABA1C  PTT: No components found for: LABPTT    Review of Systems    Assessment:     Physical Exam      Mj Risk Score: Mj Scale Score: 20    Patient Active Problem List   Diagnosis Code    Acute on chronic diastolic CHF (congestive heart failure) ([facility]) I50.33    Pathological fracture of lumbar vertebra due to secondary osteoporosis ([facility]) M80.88XA    Intractable back pain M54.9    Age-related osteoporosis with current pathological fracture M80.00XA    Bilateral primary osteoarthritis of hip M16.0    DDD (degenerative disc disease), lumbar M51.36       Patient Active Problem List   Diagnosis    Acute on chronic diastolic CHF (congestive heart failure) ([facility] [address]    Pathological fracture of lumbar vertebra due to secondary osteoporosis ([facility])    Intractable back pain    Age-related osteoporosis with current pathological fracture    Bilateral primary osteoarthritis of hip    DDD (degenerative disc disease), lumbar       Measurements:  Wound 09/20/21 Back Mid (Active)   Wound Image   09/22/21 1130   Wound Etiology Pressure Stage  3 09/22/21 1130   Dressing Status New dressing applied; Old drainage noted 09/22/21 1130   Wound Cleansed Cleansed with saline 09/22/21 1130   Dressing/Treatment Pharmaceutical agent (see MAR); Petroleum impregnated gauze; Foam 09/22/21 1130   Offloading for Diabetic Foot Ulcers Other (comment) 09/22/21 1130   Dressing Change Due 09/23/21 09/22/21 1130   Wound Length (cm) 2.8 cm 09/22/21 1130   Wound Width (cm) 1.1 cm 09/22/21 1130   Wound Depth (cm) 0.1 cm 09/22/21 1130   Wound Surface Area (cm^2) 3.08 cm^2 09/22/21 1130   Wound Volume (cm^3) 0.308 cm^3 09/22/21 1130   Wound Assessment Dry;Slough;Pink/red 09/22/21 1130   Drainage Amount Small 09/22/21 1130   Drainage Description Serosanguinous 09/22/21 1130   Odor None 09/22/21 1130   Rama-wound Assessment Intact; Blanchable erythema 09/22/21 1130   Margins Attached edges; Defined edges 09/22/21 1130   Wound Thickness Description not for Pressure Injury Full thickness 09/22/21 1130   Number of days: 2     Mid back wound: over protruding spinal bone. Center wound bed with thin layer of slough tissue- will recommend Santyl. Response to treatment:  Well tolerated by patient. Plan:     Plan of Care:     Mid back wound care:  cleanse with saline, pat dry. Apply Santyl ointment, nickel thickness to cover wound bed. Cover with oil emulsion dressing. Cover with foam, change daily.    -Offload back with pillows on her flank areas to prevent mid back from touching chairback. No lying on back in bed. -Turn every 2 hours    -Float heels off of bed with pillows under calves    -Sacral foam dressing to sacrococcygeal area. Peel back dressing, inspect skin beneath, re-secure. Change every 72 hours and prn wrinkles, soilage. Discontinue if repeatedly soiled by incontinence.     -Routine incontinence care with foaming cleanser and zinc oxide cream. Apply zinc oxide cream BID and prn incontinence. Use moisture wicking under pads vs cloth backed briefs    -Static air overlay. Check inflation each shift by sliding hand under the air overlay. Feel for the patient's heaviest/ most dependant body part. Ideally 1/2 to 1\" of air will be between your hand and the patient's body. Add air prn. Specialty Bed Required : Yes   [] Low Air Loss   [] Pressure Redistribution  [] Fluid Immersion  [] Bariatric  [] Total Pressure Relief  [] Other:     Current Diet: ADULT DIET;  Regular  Dietician consult:  Yes    Discharge Plan:  Placement for patient upon discharge: skilled nursing   Patient appropriate for Outpatient [address]: Yes    Patient/Caregiver Teaching:  Level of patientunderstanding able to:     [x] Indicates understanding       [] Needs reinforcement  [] Unsuccessful      [x] Verbal Understanding  [] Demonstrated understanding       [] No evidence of learning  [] Refused teaching         [] N/A       Electronically signed by Katerin Harkins RN on  9/22/2021 at 1:54 PM

## 2021-09-22 NOTE — PROGRESS NOTES
Patient has been calling out for Tylenol for her back pain rating pain levels at 10, Patient was informed that she has another pain med ordered to control her pain. Patient states that she do not want to take anything else besides the tylenol and that she needs to talk to her daughter first because she doesn't feel safe taking it. Patient states that Ton Simpson makes her stool black and she is allergic to other meds and she do not want to have a reaction. Patient was educated on prescribed meds and patient still refuses.

## 2021-09-23 ENCOUNTER — APPOINTMENT (OUTPATIENT)
Dept: GENERAL RADIOLOGY | Age: 81
DRG: 981 | End: 2021-09-23
Payer: COMMERCIAL

## 2021-09-23 VITALS
RESPIRATION RATE: 16 BRPM | HEART RATE: 74 BPM | HEIGHT: 61 IN | SYSTOLIC BLOOD PRESSURE: 113 MMHG | BODY MASS INDEX: 23.7 KG/M2 | WEIGHT: 125.5 LBS | TEMPERATURE: 98 F | DIASTOLIC BLOOD PRESSURE: 45 MMHG | OXYGEN SATURATION: 97 %

## 2021-09-23 LAB
ABSOLUTE EOS #: 0.4 K/UL (ref 0–0.44)
ABSOLUTE IMMATURE GRANULOCYTE: 0.09 K/UL (ref 0–0.3)
ABSOLUTE LYMPH #: 1.61 K/UL (ref 1.1–3.7)
ABSOLUTE MONO #: 0.79 K/UL (ref 0.1–1.2)
ANION GAP SERPL CALCULATED.3IONS-SCNC: 10 MMOL/L (ref 9–17)
BASOPHILS # BLD: 1 % (ref 0–2)
BASOPHILS ABSOLUTE: 0.07 K/UL (ref 0–0.2)
BUN BLDV-MCNC: 20 MG/DL (ref 8–23)
BUN/CREAT BLD: 13 (ref 9–20)
CALCIUM SERPL-MCNC: 9 MG/DL (ref 8.6–10.4)
CHLORIDE BLD-SCNC: 96 MMOL/L (ref 98–107)
CO2: 30 MMOL/L (ref 20–31)
CREAT SERPL-MCNC: 1.55 MG/DL (ref 0.5–0.9)
DIFFERENTIAL TYPE: ABNORMAL
EOSINOPHILS RELATIVE PERCENT: 6 % (ref 1–4)
GFR AFRICAN AMERICAN: 39 ML/MIN
GFR NON-AFRICAN AMERICAN: 32 ML/MIN
GFR SERPL CREATININE-BSD FRML MDRD: ABNORMAL ML/MIN/{1.73_M2}
GFR SERPL CREATININE-BSD FRML MDRD: ABNORMAL ML/MIN/{1.73_M2}
GLUCOSE BLD-MCNC: 105 MG/DL (ref 70–99)
GLUCOSE BLD-MCNC: 114 MG/DL (ref 65–105)
GLUCOSE BLD-MCNC: 271 MG/DL (ref 65–105)
HCT VFR BLD CALC: 28.1 % (ref 36.3–47.1)
HEMOGLOBIN: 9.3 G/DL (ref 11.9–15.1)
IMMATURE GRANULOCYTES: 1 %
INR BLD: 1.4
LYMPHOCYTES # BLD: 23 % (ref 24–43)
MAGNESIUM: 1.8 MG/DL (ref 1.6–2.6)
MCH RBC QN AUTO: 30.9 PG (ref 25.2–33.5)
MCHC RBC AUTO-ENTMCNC: 33.1 G/DL (ref 28.4–34.8)
MCV RBC AUTO: 93.4 FL (ref 82.6–102.9)
MONOCYTES # BLD: 11 % (ref 3–12)
NRBC AUTOMATED: 0 PER 100 WBC
PDW BLD-RTO: 13 % (ref 11.8–14.4)
PLATELET # BLD: 268 K/UL (ref 138–453)
PLATELET ESTIMATE: ABNORMAL
PMV BLD AUTO: 8.5 FL (ref 8.1–13.5)
POTASSIUM SERPL-SCNC: 3.7 MMOL/L (ref 3.7–5.3)
PROTHROMBIN TIME: 16.6 SEC (ref 11.5–14.2)
RBC # BLD: 3.01 M/UL (ref 3.95–5.11)
RBC # BLD: ABNORMAL 10*6/UL
SEG NEUTROPHILS: 58 % (ref 36–65)
SEGMENTED NEUTROPHILS ABSOLUTE COUNT: 4.14 K/UL (ref 1.5–8.1)
SODIUM BLD-SCNC: 136 MMOL/L (ref 135–144)
WBC # BLD: 7.1 K/UL (ref 3.5–11.3)
WBC # BLD: ABNORMAL 10*3/UL

## 2021-09-23 PROCEDURE — 85610 PROTHROMBIN TIME: CPT

## 2021-09-23 PROCEDURE — 6370000000 HC RX 637 (ALT 250 FOR IP): Performed by: ORTHOPAEDIC SURGERY

## 2021-09-23 PROCEDURE — 97116 GAIT TRAINING THERAPY: CPT

## 2021-09-23 PROCEDURE — 80048 BASIC METABOLIC PNL TOTAL CA: CPT

## 2021-09-23 PROCEDURE — 82947 ASSAY GLUCOSE BLOOD QUANT: CPT

## 2021-09-23 PROCEDURE — 83735 ASSAY OF MAGNESIUM: CPT

## 2021-09-23 PROCEDURE — 6370000000 HC RX 637 (ALT 250 FOR IP): Performed by: FAMILY MEDICINE

## 2021-09-23 PROCEDURE — 71045 X-RAY EXAM CHEST 1 VIEW: CPT

## 2021-09-23 PROCEDURE — 85025 COMPLETE CBC W/AUTO DIFF WBC: CPT

## 2021-09-23 PROCEDURE — 97530 THERAPEUTIC ACTIVITIES: CPT

## 2021-09-23 PROCEDURE — 36415 COLL VENOUS BLD VENIPUNCTURE: CPT

## 2021-09-23 RX ORDER — TRAMADOL HYDROCHLORIDE 50 MG/1
50 TABLET ORAL EVERY 6 HOURS PRN
Qty: 15 TABLET | Refills: 0 | Status: SHIPPED | OUTPATIENT
Start: 2021-09-23 | End: 2021-09-26

## 2021-09-23 RX ORDER — WARFARIN SODIUM 5 MG/1
5 TABLET ORAL
Status: DISCONTINUED | OUTPATIENT
Start: 2021-09-23 | End: 2021-09-23 | Stop reason: HOSPADM

## 2021-09-23 RX ORDER — TORSEMIDE 20 MG/1
40 TABLET ORAL DAILY
Qty: 30 TABLET | Refills: 0 | Status: SHIPPED | OUTPATIENT
Start: 2021-09-24

## 2021-09-23 RX ORDER — CYCLOBENZAPRINE HCL 10 MG
10 TABLET ORAL 3 TIMES DAILY PRN
Qty: 30 TABLET | Refills: 0 | Status: SHIPPED | OUTPATIENT
Start: 2021-09-23 | End: 2021-10-02

## 2021-09-23 RX ADMIN — TRAMADOL HYDROCHLORIDE 50 MG: 50 TABLET, FILM COATED ORAL at 06:33

## 2021-09-23 RX ADMIN — POTASSIUM CHLORIDE 20 MEQ: 20 TABLET, EXTENDED RELEASE ORAL at 09:06

## 2021-09-23 RX ADMIN — INSULIN LISPRO 6 UNITS: 100 INJECTION, SOLUTION INTRAVENOUS; SUBCUTANEOUS at 12:37

## 2021-09-23 RX ADMIN — COLLAGENASE SANTYL: 250 OINTMENT TOPICAL at 12:45

## 2021-09-23 RX ADMIN — TORSEMIDE 40 MG: 20 TABLET ORAL at 09:05

## 2021-09-23 RX ADMIN — MAGNESIUM OXIDE TAB 400 MG (241.3 MG ELEMENTAL MG) 400 MG: 400 (241.3 MG) TAB at 09:05

## 2021-09-23 RX ADMIN — TIMOLOL MALEATE 1 DROP: 5 SOLUTION OPHTHALMIC at 12:40

## 2021-09-23 RX ADMIN — CYCLOBENZAPRINE 10 MG: 10 TABLET, FILM COATED ORAL at 02:30

## 2021-09-23 RX ADMIN — LEVOTHYROXINE SODIUM 25 MCG: 25 TABLET ORAL at 06:33

## 2021-09-23 RX ADMIN — FERROUS SULFATE TAB EC 325 MG (65 MG FE EQUIVALENT) 325 MG: 325 (65 FE) TABLET DELAYED RESPONSE at 09:05

## 2021-09-23 RX ADMIN — ASPIRIN 81 MG: 81 TABLET, CHEWABLE ORAL at 09:05

## 2021-09-23 RX ADMIN — TRAMADOL HYDROCHLORIDE 50 MG: 50 TABLET, FILM COATED ORAL at 13:54

## 2021-09-23 ASSESSMENT — PAIN DESCRIPTION - PAIN TYPE
TYPE: SURGICAL PAIN
TYPE: SURGICAL PAIN

## 2021-09-23 ASSESSMENT — PAIN DESCRIPTION - LOCATION
LOCATION: BACK
LOCATION: BACK

## 2021-09-23 ASSESSMENT — PAIN DESCRIPTION - ONSET
ONSET: ON-GOING
ONSET: ON-GOING

## 2021-09-23 ASSESSMENT — PAIN SCALES - GENERAL
PAINLEVEL_OUTOF10: 10
PAINLEVEL_OUTOF10: 8
PAINLEVEL_OUTOF10: 6

## 2021-09-23 ASSESSMENT — PAIN DESCRIPTION - FREQUENCY
FREQUENCY: CONTINUOUS
FREQUENCY: CONTINUOUS

## 2021-09-23 ASSESSMENT — PAIN DESCRIPTION - PROGRESSION
CLINICAL_PROGRESSION: NOT CHANGED
CLINICAL_PROGRESSION: NOT CHANGED

## 2021-09-23 ASSESSMENT — PAIN DESCRIPTION - DESCRIPTORS
DESCRIPTORS: SHARP
DESCRIPTORS: CONSTANT;THROBBING

## 2021-09-23 NOTE — PROGRESS NOTES
Warfarin Dosing - Pharmacy Consult Note  Consulting Provider: Jacey  Indication:  Mechanical Heart Valve (mitral),Afib   Warfarin Dose prior to admission: 1 mg t/th,2mg rest of week   Concurrent anticoagulants/antiplatelets: none  Significant Drug Interactions: amiodarone (incr INR),levothyroxine, tramadol , vit k 1 mg 4 days ago . Recent Labs     09/21/21  0408 09/22/21  0535 09/23/21  0603   INR 1.5 1.3 1.4   HGB 8.5* 8.8* 9.3*    274 268     Recent warfarin administrations                     warfarin (COUMADIN) tablet 5 mg (mg) 5 mg Given 09/22/21 1709    warfarin (COUMADIN) tablet 2 mg (mg) 2 mg Given 09/21/21 1750                   Date   INR    Dose  9/23       1.4         5mg on 9/22    Assessment/Plan  (Goal INR: 2.5 - 3.5)  Coumadin 5mg today. Inr in am    Active problem list reviewed. INR orders are placed. Chart reviewed for pertinent labs, drug/diet interactions, and past doses. Documentation of patient's clinical condition was reviewed. Pharmacy Dosing:  Pharmacy will continue to follow.

## 2021-09-23 NOTE — DISCHARGE SUMMARY
[facility]    Adult Hospitalist      Patient ID: Mike Salazar  MRN: [id number]     Acct:  [de-identified]       Patient's PCP: Gauri Botello MD    Admit Date: 9/19/2021     Discharge Date:    09/23/2021    Admitting Physician: Toan Alberts MD    Discharge Physician: Maryann Evans MD     CONSULTANTS: Patient Care Team:  Gauri Botello MD as PCP - General (Family Medicine)    PROCEDURES PERFORMED:   Kyphoplasty    Active Discharge Diagnoses:  Chest pain  Acute on chronic diastolic congestive heart failure  Pleural effusion secondary to above  Chronic coronary disease  Paroxysmal atrial fibrillation  History of AVR, MVR and TV repair  Acute kidney injury  L1 compression fracture, osteoporotic  Back pain secondary to above  Iron deficiency anemia  Hyperlipidemia, intolerant to statins  Acquired hypothyroidism  Essential hypertension  Depression with anxiety      Primary Problem  <principal problem not specified>    Hospital Course:     Pt admitted through the ED where she presented with c/o chest pain. Pt says pain has been intermittent with episodes of chest pain lasting ten to twenty minutes. Pain usually occurred at rest. Pain was sharp, moderate, localized over the precordium, non radiating and not associated with nausea, vomiting or diaphoresis. Pt denies having any palpitations, fever, chills, cough or wheezing associated with the chest pain.      Apparently pt was given Nitro and aspirin by EMTs after which the pain resolved. Pt denies having any dyspnea, cough, wheezing, nausea, vomiting or abdominal pain. Pt says she had fallen a few weeks ago and has had pain in her back since then also. Pt was found to have an L1 compression fracture. Pt denies hitting her head or neck anywhere. Pt denies any headache, vision change, fever, chills, neck pain, photophobia, rash or joint swelling. Patient evaluated by Cardiology and pulmonology during hospital stay.   Patient received diuretics. Was already On demadex 40mg daily which was continued. Pulmonology did not recommend drainage for pleural effusion at this point. Cardiology recommended to continue aspirin and beta-blocker. Patient is intolerant to statins. Orthopedic evaluated the patient patient has had kyphoplasty. Postoperatively patient did well. Patient is discharge once consulting service cleared in signed off. The time of discharge patient was hemodynamically stable and asymptomatic. The plan was discussed in detail with patient who agreed with the plan and verbalized understanding . The patient was seen and examined on day of discharge and this discharge summary is in conjunction with any daily progress note from day of discharge.     Hospital Data:    Labs:    Hematology:  Recent Labs     09/21/21 0408 09/22/21  0535 09/23/21  0603   WBC 6.9 7.3 7.1   RBC 2.77* 2.86* 3.01*   HGB 8.5* 8.8* 9.3*   HCT 26.0* 26.7* 28.1*   MCV 93.9 93.4 93.4   MCH 30.7 30.8 30.9   MCHC 32.7 33.0 33.1   RDW 12.7 12.9 13.0    274 268   MPV 8.7 8.4 8.5   INR 1.5 1.3 1.4     Chemistry:  Recent Labs     09/21/21 0408 09/22/21  0535 09/23/21  0603    137 136   K 3.6* 3.4* 3.7   CL 95* 97* 96*   CO2 31 30 30   GLUCOSE 98 113* 105*   BUN 20 15 20   CREATININE 1.32* 1.30* 1.55*   MG  --  1.7 1.8   ANIONGAP 10 10 10   LABGLOM 39* 39* 32*   GFRAA 47* 48* 39*   CALCIUM 9.0 8.8 9.0   PROBNP  --  3,129*  --      Recent Labs     09/21/21 2020 09/22/21  0535 09/22/21  0621 09/22/21  1139 09/22/21  1636 09/22/21  1958 09/23/21  0610   CHOL  --  144  --   --   --   --   --    HDL  --  41  --   --   --   --   --    LDLCHOLESTEROL  --  82  --   --   --   --   --    CHOLHDLRATIO  --  3.5  --   --   --   --   --    TRIG  --  103  --   --   --   --   --    VLDL  --  NOT REPORTED  --   --   --   --   --    POCGLU 177*  --  123* 239* 84 261* 114*     Lab Results   Component Value Date    INR 1.4 09/23/2021    INR 1.3 09/22/2021    INR 1.5 2021    PROTIME 16.6 (H) 2021    PROTIME 15.8 (H) 2021    PROTIME 17.4 (H) 2021     Lab Results   Component Value Date/Time    SPECIAL NOT REPORTED 2021 10:05 PM     Lab Results   Component Value Date/Time    CULTURE NO SIGNIFICANT GROWTH 2021 10:05 PM       No results found for: POCPH, PHART, PH, POCPCO2, HSE5JFF, PCO2, POCPO2, PO2ART, PO2, POCHCO3, FVT6BJZ, HCO3, NBEA, PBEA, BEART, BE, THGBART, THB, SBW5LRP, HRYT1WGI, B3FQPXFX, O2SAT, FIO2    Radiology:    XR CHEST (2 VW)    Result Date: 2021  Persistent left pleural effusion. No definite evidence to suggest infiltrate at this time. XR CHEST PORTABLE    Result Date: 2021  There is a moderate size left pleural effusion with probable infiltrate at the left base. This appears stable compared to a partial visualization on thoracic spine imaging 2021.          All radiological studies reviewed      Reviews of Symptoms:    A 10 point system is reviewed and  negative except described in hospital course    Physical Exam:    Vitals:  BP (!) 114/47   Pulse 73   Temp 98.4 °F (36.9 °C) (Oral)   Resp 16   Ht 5' 1\" (1.549 m)   Wt 125 lb 8 oz (56.9 kg)   SpO2 95%   BMI 23.71 kg/m²   Temp (24hrs), Av.9 °F (36.1 °C), Min:92.3 °F (33.5 °C), Max:98.4 °F (36.9 °C)      General appearance - alert, well appearing, and in no acute distress  Mental status - oriented to person, place, and time with normal affect  Head - normocephalic and atraumatic  Eyes - pupils equal and reactive, extraocular eye movements intact, conjunctiva clear  Ears - hearing appears to be intact  Nose - no drainage noted  Mouth - mucous membranes moist  Neck - supple, no carotid bruits, thyroid not palpable  Chest - clear to auscultation, normal effort  Heart - normal rate, regular rhythm, no murmur  Abdomen - soft, nontender, nondistended, bowel sounds present all four quadrants, no masses, hepatomegaly or splenomegaly  Neurological - carvedilol (COREG) 6.25 MG tablet  TAKE 1 TABLET BY MOUTH TWICE A DAY WITH FOOD             cyclobenzaprine (FLEXERIL) 10 MG tablet  Take 1 tablet by mouth 3 times daily as needed for Muscle spasms             ferrous sulfate (IRON 325) 325 (65 Fe) MG tablet  Take 325 mg by mouth             levothyroxine (SYNTHROID) 25 MCG tablet  Take 25 mcg by mouth daily             MAGNESIUM PO  Take 400 mg by mouth daily             metFORMIN (GLUCOPHAGE) 500 MG tablet  Take 500 mg by mouth 2 times daily (with meals)             mirabegron (MYRBETRIQ) 25 MG TB24  Take 25 mg by mouth daily             potassium chloride (KLOR-CON M) 20 MEQ extended release tablet  Take 20 mEq by mouth daily             timolol (TIMOPTIC) 0.5 % ophthalmic solution  timolol maleate 0.5 % eye drops             torsemide (DEMADEX) 20 MG tablet  Take 2 tablets by mouth daily             warfarin (COUMADIN) 2 MG tablet  TAKE 1 TABLET BY MOUTH EVERY DAY                 Code Status:  Full Code    Time Spent on discharge is  35 mins in patient examination, evaluation, counseling as well as medication reconciliation, prescriptions for required medications, discharge plan and follow up. Electronically signed by Anna Alexis MD on 9/23/2021 at 10:35 AM     Thank you Dr. Ok Wolf MD for the opportunity to be involved in this patient's care. This note was created with the assistance of a speech-recognition program.  Although the intention is to generate a document that actually reflects the content of the visit, no guarantees can be provided that every mistake has been identified and corrected by editing. Note was updated later by me after  physical examination and  completion of the assessment.

## 2021-09-23 NOTE — PLAN OF CARE
Problem: Falls - Risk of:  Goal: Will remain free from falls  Description: Will remain free from falls  Outcome: Completed  Goal: Absence of physical injury  Description: Absence of physical injury  Outcome: Completed     Problem:  Activity Intolerance:  Goal: Ability to tolerate increased activity will improve  Description: Ability to tolerate increased activity will improve  Outcome: Completed     Problem: Cardiac Output - Decreased:  Goal: Hemodynamic stability will improve  Description: Hemodynamic stability will improve  Outcome: Completed     Problem: Fluid Volume - Excess:  Goal: Control of fluid volume excess will improve  Description: Control of fluid volume excess will improve  Outcome: Completed     Problem: Venous Thromboembolism:  Goal: Will show no signs or symptoms of venous thromboembolism  Description: Will show no signs or symptoms of venous thromboembolism  Outcome: Completed  Goal: Absence of signs or symptoms of impaired coagulation  Description: Absence of signs or symptoms of impaired coagulation  Outcome: Completed     Problem: Serum Glucose Level - Abnormal:  Goal: Ability to maintain appropriate glucose levels will improve  Description: Ability to maintain appropriate glucose levels will improve  Outcome: Completed     Problem: Pain:  Goal: Pain level will decrease  Description: Pain level will decrease  Outcome: Completed  Goal: Control of acute pain  Description: Control of acute pain  Outcome: Completed  Goal: Control of chronic pain  Description: Control of chronic pain  Outcome: Completed     Problem: Nutrition  Goal: Optimal nutrition therapy  Outcome: Completed     Problem: IP BALANCE  Goal: LTG - Patient will maintain balance to allow for safe/functional mobility  Outcome: Completed     Problem: Skin Integrity:  Goal: Will show no infection signs and symptoms  Description: Will show no infection signs and symptoms  Outcome: Completed  Goal: Absence of new skin breakdown  Description: Absence of new skin breakdown  Outcome: Completed

## 2021-09-23 NOTE — PROGRESS NOTES
Pulmonary Critical Care Progress Note  Zuly Perez MD     Patient seen for the follow up of recurrent left pleural effusion    Subjective:  No significant overnight events noted. She is currently sitting up in the bedside chair, feeling better than she did yesterday. Her daughter is at bedside. Her back pain is better today. She denies any shortness of breath when she was up ambulating. She does not have a cough or any chest pain. She just started working with incentive spirometer. Examination:  Vitals: BP (!) 114/47   Pulse 73   Temp 98.4 °F (36.9 °C) (Oral)   Resp 16   Ht 5' 1\" (1.549 m)   Wt 125 lb 8 oz (56.9 kg)   SpO2 95%   BMI 23.71 kg/m²   General appearance: alert and cooperative with exam, sitting up in the bedside chair  Neck: No JVD  Lungs: Moderate air exchange, faint crackles left base  Heart: regular rate and rhythm, S1, S2 normal, no gallop  Abdomen: Soft, non tender, + BS  Extremities: no cyanosis or clubbing. Trace edema    LABs:  CBC:   Recent Labs     09/22/21  0535 09/23/21  0603   WBC 7.3 7.1   HGB 8.8* 9.3*   HCT 26.7* 28.1*    268     BMP:   Recent Labs     09/22/21  0535 09/23/21  0603    136   K 3.4* 3.7   CO2 30 30   BUN 15 20   CREATININE 1.30* 1.55*   LABGLOM 39* 32*   GLUCOSE 113* 105*     PT/INR:   Recent Labs     09/22/21  0535 09/23/21  0603   PROTIME 15.8* 16.6*   INR 1.3 1.4     Radiology:  9/23/2021  Pending    Impression:  · Recurrent left pleural effusion with possible underlying infiltrate  · Diastolic heart failure  · Compression fracture s/p kyphoplasty of L1 on 9/21/2022  · Paroxysmal atrial fibrillation, s/p maze  · Anemia  · BAM  · History of AVR, MVR, TV repair, CAD, HLD, HTN    Recommendations:  · Incentive spirometry every hour while awake  · Diuresis with Demadex per cardiology   · Pain control per others  · DVT prophylaxis, on Coumadin  · Will monitor patient's effusion for now.   If drainage becomes more frequent such as 1-2 times a month she she patient may benefit from Pleurx catheter placement at that time. She does not want she does not really want to undergo another thoracentesis at this time. She can follow-up with her pulmonologist Ayala Brody as an outpatient. · Discussed with patient and her daughter  · No objection to discharge from pulmonary standpoint.     Electronically signed by     Sharon Huizar MD on 9/23/2021 at 12:58 PM  Pulmonary Critical Care and Sleep Medicine,  [facility]  Cell: [phone]  Office: [phone]

## 2021-09-23 NOTE — CARE COORDINATION
Discharge Planning:    Rx, F2F, and face sheet faxed to [facility] at [phone] for a rollator walker. Walker to be delivered to patient's home.

## 2021-09-23 NOTE — PROGRESS NOTES
Warfarin Dosing - Pharmacy Consult Note  Consulting Provider: Jacey  Indication:  Mechanical Heart Valve (mitral),Afib   Warfarin Dose prior to admission: 1 mg t/th,2mg rest of week   Concurrent anticoagulants/antiplatelets: none  Significant Drug Interactions: amiodarone (incr INR),levothyroxine, tramadol , vit k 1 mg 4 days ago . Recent Labs     09/21/21  0408 09/22/21  0535 09/23/21  0603   INR 1.5 1.3 1.4   HGB 8.5* 8.8* 9.3*    274 268     Recent warfarin administrations                     warfarin (COUMADIN) tablet 5 mg (mg) 5 mg Given 09/22/21 1709    warfarin (COUMADIN) tablet 2 mg (mg) 2 mg Given 09/21/21 1750                   Date   INR    Dose  9/23       1.4         5mg on 9/22    Assessment/Plan  (Goal INR: 2.5 - 3.5)  Coumadin 5mg today. Inr in am. Will ask to bridge with lovenox until inr 2.5. Active problem list reviewed. INR orders are placed. Chart reviewed for pertinent labs, drug/diet interactions, and past doses. Documentation of patient's clinical condition was reviewed. Pharmacy Dosing:  Pharmacy will continue to follow.

## 2021-09-23 NOTE — PROGRESS NOTES
Prerna Melendez was evaluated today and a DME order was entered for a wheeled walker with seat because she requires this to successfully complete daily living tasks of ambulating. A wheeled walker with seat is necessary due to the patient's unsteady gait, upper body weakness, inability to  and ambulation device, ambulating only short distances by pushing a walker, and the need to sit for a short time before resuming ambulation. These tasks cannot be completed with a lesser ambulation device such as a cane, crutch, or standard walker. The need for this equipment was discussed with the patient and she understands and is in agreement.

## 2021-09-23 NOTE — PROGRESS NOTES
Tried to encourage patient to use incentive spirometer. She is currently refusing to do so. Explained the benefits of doing so, especially after having surgery. Patient still refuses to try.

## 2021-09-23 NOTE — PROGRESS NOTES
Subjective: Up in chair. Denies CP or dyspnea.      VS: BP (!) 114/47   Pulse 73   Temp 98.4 °F (36.9 °C) (Oral)   Resp 16   Ht 5' 1\" (1.549 m)   Wt 125 lb 8 oz (56.9 kg)   SpO2 95%   BMI 23.71 kg/m²     Wt:     I/O: In: -   Out: 675 [Urine:675]    Monitor:SR    Meds: Scheduled Meds:   collagenase   Topical Daily    lidocaine  1 patch TransDERmal Daily    amiodarone  100 mg Oral Daily    levothyroxine  25 mcg Oral Daily    timolol  1 drop Both Eyes Daily    sodium chloride flush  5-40 mL IntraVENous 2 times per day    aspirin  81 mg Oral Daily    carvedilol  6.25 mg Oral BID WC    ferrous sulfate  325 mg Oral Daily with breakfast    magnesium oxide  400 mg Oral Daily    potassium chloride  20 mEq Oral Daily with breakfast    insulin lispro  0-12 Units SubCUTAneous TID WC    insulin lispro  0-6 Units SubCUTAneous Nightly    torsemide  40 mg Oral Daily     Continuous Infusions:   sodium chloride      sodium chloride      dextrose       PRN Meds:.sodium chloride, ondansetron **OR** ondansetron, polyethylene glycol, acetaminophen **OR** acetaminophen, traMADol, sodium chloride flush, sodium chloride, cyclobenzaprine, glucose, dextrose, glucagon (rDNA), dextrose, ALPRAZolam     Exam:General Appearance:AOX3, NAD  Skin:warm, dry  Pulmonary/Chest: Diminished left base  Cardiovascular:RRR, negative JVD  Extremities: Trivial peripheral edema    Labs:   CBC with Differential:    Lab Results   Component Value Date    WBC 7.1 09/23/2021    RBC 3.01 09/23/2021    HGB 9.3 09/23/2021    HCT 28.1 09/23/2021     09/23/2021    MCV 93.4 09/23/2021    MCH 30.9 09/23/2021    MCHC 33.1 09/23/2021    RDW 13.0 09/23/2021    LYMPHOPCT 23 09/23/2021    MONOPCT 11 09/23/2021    BASOPCT 1 09/23/2021    MONOSABS 0.79 09/23/2021    LYMPHSABS 1.61 09/23/2021    EOSABS 0.40 09/23/2021    BASOSABS 0.07 09/23/2021    DIFFTYPE NOT REPORTED 09/23/2021     BMP:    Lab Results   Component Value Date     09/23/2021    K 3.7 09/23/2021    CL 96 09/23/2021    CO2 30 09/23/2021    BUN 20 09/23/2021    CREATININE 1.55 09/23/2021    CALCIUM 9.0 09/23/2021    GFRAA 39 09/23/2021    LABGLOM 32 09/23/2021    GLUCOSE 105 09/23/2021       Warfarin PT/INR:    Lab Results   Component Value Date    PROTIME 16.6 09/23/2021    INR 1.4 09/23/2021         A/P:1. CAD  -Stable and angina free. On ASA and B. Blocker. Intolerant to Statins.      2. HTN  -BP stable.      3. CHF/Pleural effusion  -Secondary to chronic LV diastolic dysfunction, renal insufficiency, and anemia.   No plans for thoracentesis. On Demadex 40mg daily.      4. Paroxysmal atrial fibrillation  -S/P Maze. In SR. On Amiodarone and B. Blocker. AC with Coumadin, managed per Pharmacy.      5. Hx AVR, MVR, TV repair  -Stable.      6. Mixed hyperlipidemia  -Intolerant to Statins. Patient declines other medications.      7. Anemia  -On Ferrous Sulfate.      8. Lumbar fracture  -Ortho following. S/P Kyphoplasty    No objection to DC from CV standpoint. F/U in 4 weeks.      Will discuss with Dr. Chitra Prado  Electronically signed by Lenetta Paget, APRN - CNP on 9/23/2021 at 10:04 AM

## 2021-09-23 NOTE — CARE COORDINATION
Social Work-[facility] approved patient and is working on [employer].  Tentative dc time is 6 PM. Rajwinder Mosley

## 2021-09-23 NOTE — PROGRESS NOTES
Physical Therapy  Facility/Department: [facility] MED SURG  Daily Treatment Note  NAME: Berny Tao  : 1940  MRN: [id number]    Date of Service: 2021    Discharge Recommendations:  Patient would benefit from continued therapy after discharge        Assessment   Body structures, Functions, Activity limitations: Decreased functional mobility ; Decreased ADL status; Decreased strength;Decreased safe awareness;Decreased endurance;Decreased balance  Assessment: Pt with deficits of transfers, ambulation, balance, safety awareness and endurance this session. With current deficits, Pt at risk for falls & requires continued PT to maximize independence with functional mobility, balance, safety awareness & activity tolerance. Pt currently functioning below baseline. Would suggest additional therapy at time of discharge to maximize long term outcomes and prevent re-admission. Please refer to AM-PAC score for current level of function. Prognosis: Excellent  Decision Making: Medium Complexity  Exam: functional mobility, activity tolerance, Balance, & [employer] AM-PAC 6 Clicks Basic Mobility  Clinical Presentation: evolving  PT Education: Goals;PT Role;Plan of Care; Functional Mobility Training;Transfer Training;Gait Training;Energy Conservation;General Safety;Precautions  Patient Education: functional ex's, breathing techniques, safety awareness  REQUIRES PT FOLLOW UP: Yes  Activity Tolerance  Activity Tolerance: Patient limited by fatigue;Patient limited by pain; Patient limited by endurance     Patient Diagnosis(es): The primary encounter diagnosis was Clinical systolic heart failure, unspecified heart failure chronicity ([address] Diagnoses of Pleural effusion, BAM (acute kidney injury) ([address] Intractable back pain, and DDD (degenerative disc disease), lumbar were also pertinent to this visit. has a past medical history of Diabetes mellitus ([address] Hypertension, and Tachycardia.    has a past surgical history that includes Hysterectomy; Abdomen surgery; Breast surgery; and Spine surgery (N/A, 9/21/2021). Restrictions  Restrictions/Precautions  Restrictions/Precautions: General Precautions, Fall Risk  Position Activity Restriction  Spinal Precautions: No Bending, No Lifting, No Twisting  Other position/activity restrictions: Activity as tolerated, ambulate pt, Up with assist, LUE IV, s/p kyphoplasty on 9/21, back precautions, alarms  Subjective   General  Chart Reviewed:  Yes  Additional Pertinent Hx: DM, HTN, s/p open heart surgery 12/20  Response To Previous Treatment: Not applicable  Subjective  Subjective: Pt agreeable to PT  General Comment  Comments: RN okays PT  Pain Screening  Patient Currently in Pain: Yes  Pain Assessment  Pain Assessment: 0-10 (6/10)  Vital Signs  BP Location: Right Arm  Level of Consciousness: Alert (0)  Patient Currently in Pain: Yes  Oxygen Therapy  O2 Device: None (Room air)       Orientation  Orientation  Overall Orientation Status: Within Normal Limits  Cognition      Objective   Bed mobility  Supine to Sit: Unable to assess  Sit to Supine: Unable to assess  Comment: Unable to assess since pt up in chair at arrival & remained up in chair  Transfers  Sit to Stand: Minimal Assistance  Stand to sit: Minimal Assistance  Bed to Chair: Minimal assistance  Stand Pivot Transfers: Minimal Assistance  Lateral Transfers: Minimal Assistance  Comment: better awareness on correct use of upper body for safe sit/stand but some cues needed to back all way back to surface with walker close before she reaches from arms of walker to chair  Ambulation  Ambulation?: Yes  More Ambulation?: Yes  Ambulation 1  Surface: level tile  Device: Rolling Walker  Assistance: Minimal assistance  Quality of Gait: step to pattern, safety cues to keep walker close at all times & to amb inside base of walker & upright posture  Gait Deviations: Decreased step length;Decreased step height  Distance: 80ft  Ambulation 2  Surface - 2: level tile  Device 2: Rolling Walker  Quality of Gait 2: step to pattern, safety cues to keep walker close at all times & to amb inside base of walker & upright posture  Distance: 25ft   Pt amb to BR for toileting, Contact Guard Assistance to sit to toilet. Pt stood with Contact Guard Assistance,stood 3 minutes for pericare & to pull up brief, amb 2ft to sink for hand & oral hygiene x 6 minutes          Balance  Posture: Fair  Sitting - Static: Good  Sitting - Dynamic: Good  Standing - Static: Good;- (R/W)  Standing - Dynamic: Fair;+ (R/W)  Exercises  Comments: Ed spinal precautions & how to complete bed mobility with spinal precautions, circulation ex's & deep   AROM RLE (degrees)  RLE AROM: WFL  AROM LLE (degrees)  LLE AROM : WFL  AROM RUE (degrees)  RUE General AROM: see OT assessment  AROM LUE (degrees)  LUE General AROM: see OT assessment  Strength RLE  Comment: 3+/5 hip/knee, 4/5 ankle  Strength RUE  Comment: see OT assessment                 G-Code     OutComes Score                                                     AM-PAC Score  AM-PAC Inpatient Mobility Raw Score : 13 (09/22/21 0849)  AM-PAC Inpatient T-Scale Score : 36.74 (09/22/21 0849)  Mobility Inpatient CMS 0-100% Score: 64.91 (09/22/21 0849)  Mobility Inpatient CMS G-Code Modifier : CL (09/22/21 0849)          Goals  Short term goals  Time Frame for Short term goals: 12 visits  Short term goal 1: Inc bed-mobility & transfers to independent to enable pt to safely get in/OOB & chair  Short term goal 2: Inc gait to amb 350ft or > indep w/ RW to enable pt to return to previous level of independence  Short term goal 3:  Inc strength to [facility] & standing balance to good with device to facilitate pt independence for performance of ADL's & functional mobility, & reduce fall risk  Short term goal 4: Pt able to tolerate 30-40 min of activity to include 15-20 reps of ex, NMR & functional mobility including 5 minutes of standing with device to facilitate activity tolerance to [facility]  Short term goal 5: Ed pt on home ex's, spinal precautions, safety & energy principles, safety & energy principles, fall prevention, & issue written home program    Plan    Plan  Times per week: 1-2x/D, 5-6D/week  Current Treatment Recommendations: Strengthening, Balance Training, Functional Mobility Training, Transfer Training, ADL/Self-care Training, IADL Training, Gait Training, Endurance Training, Neuromuscular Re-education, Home Exercise Program, Safety Education & Training, Patient/Caregiver Education & Training  Safety Devices  Type of devices: Bed alarm in place, Call light within reach, Chair alarm in place, Gait belt, Patient at risk for falls, Left in chair, Nurse notified     Therapy Time   Individual Concurrent Group Co-treatment   Time In 1045         Time Out 1117         Minutes [address], PT

## 2021-09-23 NOTE — PROGRESS NOTES
Pt discharged to home in fair condition with belongings  Discharge instructions given  Prescriptions sent to patient's pharmacy  Pt denies having any further questions at this time  Locked up home medication(s)/personal items given to patient at discharge  Patient/family state they have everything they were admitted with.   Dressing change supplies sent home with patient

## 2021-09-23 NOTE — PROGRESS NOTES
Post Operative Progress Note    9/23/2021 3:55 PM  Subjective:   Admit Date: 9/19/2021  PCP: Tricia Martin MD  Date of Discharge: As per Dr. Madiha Summers    Medications:   Scheduled Meds:   warfarin  5 mg Oral Once    collagenase   Topical Daily    lidocaine  1 patch TransDERmal Daily    amiodarone  100 mg Oral Daily    levothyroxine  25 mcg Oral Daily    timolol  1 drop Both Eyes Daily    sodium chloride flush  5-40 mL IntraVENous 2 times per day    aspirin  81 mg Oral Daily    carvedilol  6.25 mg Oral BID WC    ferrous sulfate  325 mg Oral Daily with breakfast    magnesium oxide  400 mg Oral Daily    potassium chloride  20 mEq Oral Daily with breakfast    insulin lispro  0-12 Units SubCUTAneous TID WC    insulin lispro  0-6 Units SubCUTAneous Nightly    torsemide  40 mg Oral Daily     Continuous Infusions:   sodium chloride      sodium chloride      dextrose       PRN Meds:sodium chloride, ondansetron **OR** ondansetron, polyethylene glycol, acetaminophen **OR** acetaminophen, traMADol, sodium chloride flush, sodium chloride, cyclobenzaprine, glucose, dextrose, glucagon (rDNA), dextrose, ALPRAZolam    Diet:   Diet: ADULT DIET;  Regular    Subjective:   Systemic or Specific Complaints:Pain Control    Objective:     Patient Vitals for the past 24 hrs:   BP Temp Temp src Pulse Resp SpO2 Weight   09/23/21 1144 (!) 113/45 98 °F (36.7 °C) Oral 74 16 97 % --   09/23/21 0746 (!) 114/47 98.4 °F (36.9 °C) Oral 73 16 95 % --   09/23/21 0415 -- -- -- -- -- -- 125 lb 8 oz (56.9 kg)   09/23/21 0330 (!) 118/45 97.5 °F (36.4 °C) Oral 69 14 93 % --   09/23/21 0230 (!) 106/50 -- -- 85 -- -- --   09/22/21 2336 (!) 105/45 92.3 °F (33.5 °C) Oral 63 15 96 % --   09/22/21 2120 (!) 141/52 -- -- 69 14 99 % --   09/22/21 1917 (!) 95/38 97.3 °F (36.3 °C) Oral 69 16 97 % --   09/22/21 1623 (!) 134/54 98.1 °F (36.7 °C) Oral 67 17 98 % --     I/O last 3 completed shifts:  In: -   Out: 775 [Urine:775]  No intake/output data recorded. General: alert, appears stated age and cooperative   Wound: Wound Intact   Motion: Painful range of Motion in affected extremity   DVT Exam: No evidence of DVT seen on physical exam.  Negative Rusty's sign. No cords or calf tenderness. Additional exam:     Data Review  CBC:   Recent Labs     09/21/21  0408 09/22/21  0535 09/23/21  0603   WBC 6.9 7.3 7.1   HGB 8.5* 8.8* 9.3*    274 268     BMP:    Recent Labs     09/21/21  0408 09/22/21  0535 09/23/21  0603    137 136   K 3.6* 3.4* 3.7   CL 95* 97* 96*   CO2 31 30 30   BUN 20 15 20   CREATININE 1.32* 1.30* 1.55*   GLUCOSE 98 113* 105*     Hepatic: No results for input(s): AST, ALT, ALB, BILITOT, ALKPHOS in the last 72 hours. Troponin: No results for input(s): TROPONINI in the last 72 hours. BNP: No results for input(s): BNP in the last 72 hours. Lipids:   Recent Labs     09/22/21  0535   CHOL 144   HDL 41     INR:   Recent Labs     09/21/21  0408 09/22/21  0535 09/23/21  0603   INR 1.5 1.3 1.4         Assessment:   Mary Staff has improved from yesterday. Pain is well controlled. She has no nausea and no vomiting. Current activity is ad deisi  Incision: intact      Plan:      1: Spine suture removal, Steri-Strip  2:  Continue Deep venous thrombosis prophylaxis  3:  Continue Pain Control  4. Therapy. Walker ambulation no spine flexion, no spine lifting for 6 post fracture weeks  5. Okay to discharge orthopedically. Office follow-up 1 month. The patient has been discharged. Provided information regarding osteoporosis, kyphoplasty, fall prevention, compression fracture.     Electronically signed by Ana Cristina Lewis MD on 9/23/2021 at 3:55 PM

## 2021-09-24 ENCOUNTER — CARE COORDINATION (OUTPATIENT)
Dept: CASE MANAGEMENT | Age: 81
End: 2021-09-24

## 2021-09-24 LAB — SURGICAL PATHOLOGY REPORT: NORMAL

## 2021-09-24 NOTE — CARE COORDINATION
[facility] Transitions Initial Follow Up Call    Call within 2 business days of discharge: Yes    Patient: Louis Silva Patient : 1940   MRN: <[id number]>  Reason for Admission: Chest pain  Spikypho    Discharge Date: 21 RARS: Readmission Risk Score: 16      Last Discharge [facility]       Complaint Diagnosis Description Type Department Provider    21 Chest Pain Clinical systolic heart failure, unspecified heart failure chronicity ([address] . .. ED to Hosp-Admission (Discharged) (ADMITTED) Fe Horan MD; Jose L Trujillo. .. Spoke with: 24 hour initial call to both numbers provided. No answer. Left HIPPA compliant message to return call to this writer. Called to [facility]. SOC scheduled today. Facility: [unfilled]    Non-face-to-face services provided:  Obtained and reviewed discharge summary and/or continuity of care documents  Communication with home health agencies or other community services the patient is currently using-[facility] [address] Transitions 24 Hour Call    Care Transitions Interventions         Follow Up  No future appointments.     ZORAIDA Ambriz LPN Care Transitions Nurse   [phone]

## 2021-09-27 ENCOUNTER — CARE COORDINATION (OUTPATIENT)
Dept: CASE MANAGEMENT | Age: 81
End: 2021-09-27

## 2021-09-27 NOTE — CARE COORDINATION
[facility] Transitions Initial Follow Up Call    Call within 2 business days of discharge: Yes    Patient: Jason Mujica Patient : 1940   MRN: <[id number]>  Reason for Admission:  Acute on chronic diastolic CHF (congestive heart failure    KYPHOPLASTY WITH BIOPSY CARMS  Discharge Date: 21 RARS: Readmission Risk Score: 16      Last Discharge [address]       Complaint Diagnosis Description Type Department Provider    21 Chest Pain Clinical systolic heart failure, unspecified heart failure chronicity ([facility] . .. ED to Hosp-Admission (Discharged) (ADMITTED) Gordon Freitas MD; Evita Stokes. .. Spoke with: Patient and daughter Agueda Ledezma. Amelia Donohue states she is having some back pain r/t kyphoplasty. She takes Flexeril PRN with effect. Spoke to daughter Agueda Ledezma. Medication reconciliation complete. non [facility] provider. Per Mikala surgical incision looks good. Stitches were removed. Dressing completed daily. appetite is good. Discussed no salt diet. Pt. Uses 50/50. FBS today 123 . Agueda Ledezma states pt. Was having difficulty getting on the scale prior to surgery ( due to back pain) but will start weighing her again today. Educated on monitoring for weight gain of 3 pounds in a day and to notify Dr. Triston Latif. Has [facility] nurse come twice a week. Next PT/INR to be drawn today by [facility] nurse. Pt. Ambulates with a walker. Agueda Ledezma is calling all providers today to schedule her follow up appts. Agueda Ledezma states she will transport pt. To appts. No new issues or concerns. Pt declines the Covid vaccine. Final call. Facility: [facility]  Transitions of Care Initial Call    Was this an external facility discharge? No Discharge Facility: no    Challenges to be reviewed by the provider   Additional needs identified to be addressed with provider: No  none             Method of communication with provider : none      Advance Care Planning:   Does patient have an Advance Directive: reviewed and current. Was this a readmission? No  Patient stated reason for admission: kyphoplasty CHF  Patients top risk factors for readmission: medical condition-back pain  CHF    Care Transition Nurse (CTN) contacted the family by telephone to perform post hospital discharge assessment. Verified name and  with patient as identifiers. Provided introduction to self, and explanation of the CTN role. CTN reviewed discharge instructions, medical action plan and red flags with family who verbalized understanding. Family given an opportunity to ask questions and does not have any further questions or concerns at this time. Were discharge instructions available to patient? Yes. Reviewed appropriate site of care based on symptoms and resources available to patient including: PCP, Specialist and When to call 911. The patient agrees to contact the PCP office for questions related to their healthcare. Medication reconciliation was performed with Daughter who verbalizes understanding of administration of home medications. Advised obtaining a 90-day supply of all daily and as-needed medications. Covid Risk Education     Educated patient about risk for severe COVID-19 due to risk factors according to CDC guidelines. LPN CC reviewed discharge instructions, medical action plan and red flag symptoms with the family who verbalized understanding. Discussed COVID vaccination status: not vaccinated Education provided on COVID-19 vaccination as appropriate. Discussed exposure protocols and quarantine with CDC Guidelines. Family was given an opportunity to verbalize any questions and concerns and agrees to contact LPN CC or health care provider for questions related to their healthcare. Reviewed and educated family on any new and changed medications related to discharge diagnosis. Was patient discharged with a pulse oximeter? No Discussed and confirmed pulse oximeter discharge instructions and when to notify provider or seek emergency care.        LPN CC provided contact information. No further follow-up call indicated based on severity of symptoms and risk factors. Plan for next call: n/a          Non-face-to-face services provided:  Obtained and reviewed discharge summary and/or continuity of care documents    Care Transitions 24 Hour Call    Post Acute Services: Home Health (Comment: [facility])  Care Transitions Interventions         Follow Up  No future appointments.      Aron Mosqueda LPN Care Transitions Nurse   [phone]    Shila Avila LPN

## 2021-10-01 ENCOUNTER — TELEPHONE (OUTPATIENT)
Dept: OTHER | Facility: CLINIC | Age: 81
End: 2021-10-01

## 2021-10-01 ENCOUNTER — HOSPITAL ENCOUNTER (EMERGENCY)
Age: 81
Discharge: HOME | End: 2021-10-01
Attending: EMERGENCY MEDICINE
Payer: COMMERCIAL

## 2021-10-01 ENCOUNTER — APPOINTMENT (OUTPATIENT)
Dept: NUCLEAR MEDICINE | Age: 81
End: 2021-10-01
Payer: COMMERCIAL

## 2021-10-01 ENCOUNTER — APPOINTMENT (OUTPATIENT)
Dept: GENERAL RADIOLOGY | Age: 81
End: 2021-10-01
Payer: COMMERCIAL

## 2021-10-01 VITALS
SYSTOLIC BLOOD PRESSURE: 154 MMHG | DIASTOLIC BLOOD PRESSURE: 64 MMHG | HEART RATE: 83 BPM | OXYGEN SATURATION: 97 % | HEIGHT: 62 IN | TEMPERATURE: 98.1 F | BODY MASS INDEX: 21.16 KG/M2 | WEIGHT: 115 LBS | RESPIRATION RATE: 18 BRPM

## 2021-10-01 DIAGNOSIS — E87.6 HYPOKALEMIA: ICD-10-CM

## 2021-10-01 DIAGNOSIS — R53.1 GENERALIZED WEAKNESS: Primary | ICD-10-CM

## 2021-10-01 LAB
-: NORMAL
ABSOLUTE EOS #: 0.27 K/UL (ref 0–0.44)
ABSOLUTE IMMATURE GRANULOCYTE: 0.03 K/UL (ref 0–0.3)
ABSOLUTE LYMPH #: 0.76 K/UL (ref 1.1–3.7)
ABSOLUTE MONO #: 0.63 K/UL (ref 0.1–1.2)
AMORPHOUS: NORMAL
ANION GAP SERPL CALCULATED.3IONS-SCNC: 18 MMOL/L (ref 9–17)
BACTERIA: NORMAL
BASOPHILS # BLD: 1 % (ref 0–2)
BASOPHILS ABSOLUTE: 0.05 K/UL (ref 0–0.2)
BILIRUBIN URINE: NEGATIVE
BNP INTERPRETATION: ABNORMAL
BUN BLDV-MCNC: 36 MG/DL (ref 8–23)
BUN/CREAT BLD: 19 (ref 9–20)
CALCIUM SERPL-MCNC: 9.1 MG/DL (ref 8.6–10.4)
CASTS UA: NORMAL /LPF
CHLORIDE BLD-SCNC: 87 MMOL/L (ref 98–107)
CO2: 30 MMOL/L (ref 20–31)
COLOR: YELLOW
COMMENT UA: ABNORMAL
CREAT SERPL-MCNC: 1.92 MG/DL (ref 0.5–0.9)
CRYSTALS, UA: NORMAL /HPF
DIFFERENTIAL TYPE: ABNORMAL
EKG ATRIAL RATE: 83 BPM
EKG P AXIS: 52 DEGREES
EKG P-R INTERVAL: 240 MS
EKG Q-T INTERVAL: 474 MS
EKG QRS DURATION: 102 MS
EKG QTC CALCULATION (BAZETT): 556 MS
EKG R AXIS: 20 DEGREES
EKG T AXIS: 54 DEGREES
EKG VENTRICULAR RATE: 83 BPM
EOSINOPHILS RELATIVE PERCENT: 5 % (ref 1–4)
EPITHELIAL CELLS UA: NORMAL /HPF (ref 0–5)
GFR AFRICAN AMERICAN: 30 ML/MIN
GFR NON-AFRICAN AMERICAN: 25 ML/MIN
GFR SERPL CREATININE-BSD FRML MDRD: ABNORMAL ML/MIN/{1.73_M2}
GFR SERPL CREATININE-BSD FRML MDRD: ABNORMAL ML/MIN/{1.73_M2}
GLUCOSE BLD-MCNC: 155 MG/DL (ref 70–99)
GLUCOSE URINE: NEGATIVE
HCT VFR BLD CALC: 30.3 % (ref 36.3–47.1)
HEMOGLOBIN: 10.1 G/DL (ref 11.9–15.1)
IMMATURE GRANULOCYTES: 1 %
KETONES, URINE: NEGATIVE
LEUKOCYTE ESTERASE, URINE: NEGATIVE
LYMPHOCYTES # BLD: 13 % (ref 24–43)
MCH RBC QN AUTO: 30.2 PG (ref 25.2–33.5)
MCHC RBC AUTO-ENTMCNC: 33.3 G/DL (ref 28.4–34.8)
MCV RBC AUTO: 90.7 FL (ref 82.6–102.9)
MONOCYTES # BLD: 11 % (ref 3–12)
MUCUS: NORMAL
MYOGLOBIN: 55 NG/ML (ref 25–58)
NITRITE, URINE: NEGATIVE
NRBC AUTOMATED: 0 PER 100 WBC
OTHER OBSERVATIONS UA: NORMAL
PDW BLD-RTO: 12.7 % (ref 11.8–14.4)
PH UA: 6.5 (ref 5–8)
PLATELET # BLD: 294 K/UL (ref 138–453)
PLATELET ESTIMATE: ABNORMAL
PMV BLD AUTO: 8.3 FL (ref 8.1–13.5)
POTASSIUM SERPL-SCNC: 3.2 MMOL/L (ref 3.7–5.3)
PRO-BNP: 1578 PG/ML
PROTEIN UA: NEGATIVE
RBC # BLD: 3.34 M/UL (ref 3.95–5.11)
RBC # BLD: ABNORMAL 10*6/UL
RBC UA: NORMAL /HPF (ref 0–2)
RENAL EPITHELIAL, UA: NORMAL /HPF
SEG NEUTROPHILS: 71 % (ref 36–65)
SEGMENTED NEUTROPHILS ABSOLUTE COUNT: 4.24 K/UL (ref 1.5–8.1)
SODIUM BLD-SCNC: 135 MMOL/L (ref 135–144)
SPECIFIC GRAVITY UA: 1.01 (ref 1–1.03)
THYROXINE, FREE: 1.48 NG/DL (ref 0.93–1.7)
TRICHOMONAS: NORMAL
TROPONIN INTERP: ABNORMAL
TROPONIN T: ABNORMAL NG/ML
TROPONIN, HIGH SENSITIVITY: 28 NG/L (ref 0–14)
TSH SERPL DL<=0.05 MIU/L-ACNC: 6.62 MIU/L (ref 0.3–5)
TURBIDITY: CLEAR
URINE HGB: ABNORMAL
UROBILINOGEN, URINE: NORMAL
WBC # BLD: 6 K/UL (ref 3.5–11.3)
WBC # BLD: ABNORMAL 10*3/UL
WBC UA: NORMAL /HPF (ref 0–5)
YEAST: NORMAL

## 2021-10-01 PROCEDURE — 99283 EMERGENCY DEPT VISIT LOW MDM: CPT

## 2021-10-01 PROCEDURE — 96374 THER/PROPH/DIAG INJ IV PUSH: CPT

## 2021-10-01 PROCEDURE — 6370000000 HC RX 637 (ALT 250 FOR IP): Performed by: NURSE PRACTITIONER

## 2021-10-01 PROCEDURE — 84484 ASSAY OF TROPONIN QUANT: CPT

## 2021-10-01 PROCEDURE — 85025 COMPLETE CBC W/AUTO DIFF WBC: CPT

## 2021-10-01 PROCEDURE — 84443 ASSAY THYROID STIM HORMONE: CPT

## 2021-10-01 PROCEDURE — 84439 ASSAY OF FREE THYROXINE: CPT

## 2021-10-01 PROCEDURE — 2580000003 HC RX 258: Performed by: NURSE PRACTITIONER

## 2021-10-01 PROCEDURE — 93005 ELECTROCARDIOGRAM TRACING: CPT | Performed by: NURSE PRACTITIONER

## 2021-10-01 PROCEDURE — 83880 ASSAY OF NATRIURETIC PEPTIDE: CPT

## 2021-10-01 PROCEDURE — 71045 X-RAY EXAM CHEST 1 VIEW: CPT

## 2021-10-01 PROCEDURE — 3430000000 HC RX DIAGNOSTIC RADIOPHARMACEUTICAL: Performed by: NURSE PRACTITIONER

## 2021-10-01 PROCEDURE — 83874 ASSAY OF MYOGLOBIN: CPT

## 2021-10-01 PROCEDURE — 81001 URINALYSIS AUTO W/SCOPE: CPT

## 2021-10-01 PROCEDURE — A9540 TC99M MAA: HCPCS | Performed by: NURSE PRACTITIONER

## 2021-10-01 PROCEDURE — 78580 LUNG PERFUSION IMAGING: CPT

## 2021-10-01 PROCEDURE — 80048 BASIC METABOLIC PNL TOTAL CA: CPT

## 2021-10-01 RX ORDER — 0.9 % SODIUM CHLORIDE 0.9 %
500 INTRAVENOUS SOLUTION INTRAVENOUS ONCE
Status: COMPLETED | OUTPATIENT
Start: 2021-10-01 | End: 2021-10-01

## 2021-10-01 RX ADMIN — SODIUM CHLORIDE 500 ML: 9 INJECTION, SOLUTION INTRAVENOUS at 12:17

## 2021-10-01 RX ADMIN — POTASSIUM BICARBONATE 20 MEQ: 391 TABLET, EFFERVESCENT ORAL at 15:55

## 2021-10-01 RX ADMIN — Medication 6.8 MILLICURIE: at 14:35

## 2021-10-01 ASSESSMENT — ENCOUNTER SYMPTOMS
COUGH: 0
SHORTNESS OF BREATH: 1
CONSTIPATION: 0
SINUS PRESSURE: 0
SORE THROAT: 0
WHEEZING: 0
BACK PAIN: 1
ABDOMINAL PAIN: 0
DIARRHEA: 0
VOMITING: 0
NAUSEA: 0
COLOR CHANGE: 0
RHINORRHEA: 0

## 2021-10-01 ASSESSMENT — PAIN DESCRIPTION - LOCATION: LOCATION: BACK

## 2021-10-01 ASSESSMENT — PAIN SCALES - GENERAL: PAINLEVEL_OUTOF10: 10

## 2021-10-01 ASSESSMENT — HEART SCORE: ECG: 0

## 2021-10-01 NOTE — ED PROVIDER NOTES
EMERGENCY DEPARTMENT ENCOUNTER   ATTENDING ATTESTATION     Pt Name: Gretchen Bach  MRN: [id number]  [place] 1940  Date of evaluation: 10/1/21   Gretchen Bach is a 78 y.o. female with CC: No chief complaint on file. MDM:   The patient a 66-year-old female with a history of congestive heart failure who presented to the emergency department secondary to shortness of breath. Afebrile nontoxic-appearing no acute respiratory distress. Mild elevation in BNP however patient acute respiratory distress. Ambulate emergency part without respiratory distress. Is discharged home with outpatient follow-up and given parameters to return to the emergency department. CRITICAL CARE:       EKG: All EKG's are interpreted by the Emergency Department Physician who either signs or Co-signs this chart in the absence of a cardiologist.      RADIOLOGY:All plain film, CT, MRI, and formal ultrasound images (except ED bedside ultrasound) are read by the radiologist, see reports below, unless otherwise noted in MDM or here. No orders to display     LABS: All lab results were reviewed by myself, and all abnormals are listed below. Labs Reviewed - No data to display  CONSULTS:  None  FINAL IMPRESSION    No diagnosis found.         PASTMEDICAL HISTORY     Past Medical History:   Diagnosis Date    Diabetes mellitus ([facility]     Hypertension     Tachycardia      SURGICAL HISTORY       Past Surgical History:   Procedure Laterality Date    ABDOMEN SURGERY      BREAST SURGERY      HYSTERECTOMY      SPINE SURGERY N/A 9/21/2021    L1 KYPHOPLASTY WITH BIOPSY CARMS performed by Urbano Estrada MD at [facility]       Previous Medications    AMIODARONE (CORDARONE) 200 MG TABLET    TAKE 1 TABLET BY MOUTH EVERY DAY    ASPIRIN 81 MG EC TABLET    Take 81 mg by mouth daily     CARVEDILOL (COREG) 6.25 MG TABLET    TAKE 1 TABLET BY MOUTH TWICE A DAY WITH FOOD    CYCLOBENZAPRINE (FLEXERIL) 10 MG TABLET    Take 1 tablet by mouth 3 times daily as needed for Muscle spasms    FERROUS SULFATE (IRON 325) 325 (65 FE) MG TABLET    Take 325 mg by mouth daily (with breakfast)     LEVOTHYROXINE (SYNTHROID) 25 MCG TABLET    Take 25 mcg by mouth daily    MAGNESIUM PO    Take 400 mg by mouth daily    METFORMIN (GLUCOPHAGE) 500 MG TABLET    Take 500 mg by mouth 2 times daily (with meals)    MIRABEGRON (MYRBETRIQ) 25 MG TB24    Take 25 mg by mouth daily    POTASSIUM CHLORIDE (KLOR-CON M) 20 MEQ EXTENDED RELEASE TABLET    Take 20 mEq by mouth daily    TIMOLOL (TIMOPTIC) 0.5 % OPHTHALMIC SOLUTION    Place 1 drop into both eyes 2 times daily     TORSEMIDE (DEMADEX) 20 MG TABLET    Take 2 tablets by mouth daily    WARFARIN (COUMADIN) 2 MG TABLET    TAKE 1 TABLET BY MOUTH EVERY DAY     ALLERGIES     is allergic to ambien [zolpidem] and bactrim [sulfamethoxazole-trimethoprim]. FAMILY HISTORY     has no family status information on file. SOCIAL HISTORY       Social History     Tobacco Use    Smoking status: Never Smoker    Smokeless tobacco: Never Used   Substance Use Topics    Alcohol use: Never    Drug use: Never       I personally evaluated and examined the patient in conjunction with the APC and agree with the assessment, treatment plan, and disposition of the patient as recorded by the APC.    Natalya Stephens MD  Attending Emergency Physician          Natalya Stephens MD  50/24/62 [phone]

## 2021-10-01 NOTE — TELEPHONE ENCOUNTER
Writer contacted ED provider Florin Astudillo  to inform of 30 day readmission risk. Writer's attempt to contact ED provider was unsuccessful. No Decision on disposition at this time.

## 2021-10-01 NOTE — ED NOTES
Patient brought in by daughter for weakness and chest discomfort along with back pain. Patient has significant cardiac hx. Daughter would like her kidney function evaluated.       Julien Child RN  10/01/21 3737

## 2021-10-02 ENCOUNTER — HOSPITAL ENCOUNTER (EMERGENCY)
Age: 81
Discharge: HOME | End: 2021-10-02
Attending: EMERGENCY MEDICINE
Payer: COMMERCIAL

## 2021-10-02 ENCOUNTER — APPOINTMENT (OUTPATIENT)
Dept: CT IMAGING | Age: 81
End: 2021-10-02
Payer: COMMERCIAL

## 2021-10-02 VITALS
HEART RATE: 75 BPM | BODY MASS INDEX: 21.71 KG/M2 | OXYGEN SATURATION: 99 % | RESPIRATION RATE: 16 BRPM | TEMPERATURE: 97.2 F | WEIGHT: 115 LBS | HEIGHT: 61 IN

## 2021-10-02 DIAGNOSIS — Z87.81 HISTORY OF VERTEBRAL COMPRESSION FRACTURE: ICD-10-CM

## 2021-10-02 DIAGNOSIS — M54.50 LOW BACK PAIN WITHOUT SCIATICA, UNSPECIFIED BACK PAIN LATERALITY, UNSPECIFIED CHRONICITY: Primary | ICD-10-CM

## 2021-10-02 LAB
ABSOLUTE EOS #: 0.15 K/UL (ref 0–0.44)
ABSOLUTE IMMATURE GRANULOCYTE: 0.04 K/UL (ref 0–0.3)
ABSOLUTE LYMPH #: 0.97 K/UL (ref 1.1–3.7)
ABSOLUTE MONO #: 0.83 K/UL (ref 0.1–1.2)
ANION GAP SERPL CALCULATED.3IONS-SCNC: 14 MMOL/L (ref 9–17)
BASOPHILS # BLD: 1 % (ref 0–2)
BASOPHILS ABSOLUTE: 0.04 K/UL (ref 0–0.2)
BUN BLDV-MCNC: 35 MG/DL (ref 8–23)
BUN/CREAT BLD: 18 (ref 9–20)
CALCIUM SERPL-MCNC: 9.6 MG/DL (ref 8.6–10.4)
CHLORIDE BLD-SCNC: 87 MMOL/L (ref 98–107)
CO2: 34 MMOL/L (ref 20–31)
CREAT SERPL-MCNC: 2 MG/DL (ref 0.5–0.9)
DIFFERENTIAL TYPE: ABNORMAL
EOSINOPHILS RELATIVE PERCENT: 2 % (ref 1–4)
GFR AFRICAN AMERICAN: 29 ML/MIN
GFR NON-AFRICAN AMERICAN: 24 ML/MIN
GFR SERPL CREATININE-BSD FRML MDRD: ABNORMAL ML/MIN/{1.73_M2}
GFR SERPL CREATININE-BSD FRML MDRD: ABNORMAL ML/MIN/{1.73_M2}
GLUCOSE BLD-MCNC: 155 MG/DL (ref 70–99)
HCT VFR BLD CALC: 31 % (ref 36.3–47.1)
HEMOGLOBIN: 10.4 G/DL (ref 11.9–15.1)
IMMATURE GRANULOCYTES: 1 %
LYMPHOCYTES # BLD: 12 % (ref 24–43)
MCH RBC QN AUTO: 30.3 PG (ref 25.2–33.5)
MCHC RBC AUTO-ENTMCNC: 33.5 G/DL (ref 28.4–34.8)
MCV RBC AUTO: 90.4 FL (ref 82.6–102.9)
MONOCYTES # BLD: 11 % (ref 3–12)
NRBC AUTOMATED: 0 PER 100 WBC
PDW BLD-RTO: 12.7 % (ref 11.8–14.4)
PLATELET # BLD: 401 K/UL (ref 138–453)
PLATELET ESTIMATE: ABNORMAL
PMV BLD AUTO: 8.4 FL (ref 8.1–13.5)
POTASSIUM SERPL-SCNC: 3.3 MMOL/L (ref 3.7–5.3)
RBC # BLD: 3.43 M/UL (ref 3.95–5.11)
RBC # BLD: ABNORMAL 10*6/UL
SEG NEUTROPHILS: 73 % (ref 36–65)
SEGMENTED NEUTROPHILS ABSOLUTE COUNT: 5.8 K/UL (ref 1.5–8.1)
SODIUM BLD-SCNC: 135 MMOL/L (ref 135–144)
WBC # BLD: 7.8 K/UL (ref 3.5–11.3)
WBC # BLD: ABNORMAL 10*3/UL

## 2021-10-02 PROCEDURE — 99283 EMERGENCY DEPT VISIT LOW MDM: CPT

## 2021-10-02 PROCEDURE — 6370000000 HC RX 637 (ALT 250 FOR IP): Performed by: NURSE PRACTITIONER

## 2021-10-02 PROCEDURE — 80048 BASIC METABOLIC PNL TOTAL CA: CPT

## 2021-10-02 PROCEDURE — 85025 COMPLETE CBC W/AUTO DIFF WBC: CPT

## 2021-10-02 PROCEDURE — 72128 CT CHEST SPINE W/O DYE: CPT

## 2021-10-02 PROCEDURE — 72131 CT LUMBAR SPINE W/O DYE: CPT

## 2021-10-02 RX ORDER — ACETAMINOPHEN 325 MG/1
650 TABLET ORAL ONCE
Status: COMPLETED | OUTPATIENT
Start: 2021-10-02 | End: 2021-10-02

## 2021-10-02 RX ADMIN — ACETAMINOPHEN 650 MG: 325 TABLET ORAL at 17:04

## 2021-10-02 ASSESSMENT — PAIN SCALES - GENERAL
PAINLEVEL_OUTOF10: 10
PAINLEVEL_OUTOF10: 10

## 2021-10-02 NOTE — PROGRESS NOTES
Transitions of Care Pharmacy Service   Medication Review    The patient's list of current home medications has been reviewed and updated. Source(s) of information: Patient's family, [employer] ([facility] [employer])    Please feel free to call with any questions about this encounter. Thank you. Aaron Gomes, [facility]  Transitions of Care Pharmacy Service  Phone:  [phone]  Fax: [phone]         Prior to Admission medications    Medication Sig Start Date End Date Taking?  Authorizing Provider   torsemide (DEMADEX) 20 MG tablet Take 2 tablets by mouth daily 9/24/21   Ash Goff MD   levothyroxine (SYNTHROID) 25 MCG tablet Take 25 mcg by mouth daily    Historical Provider, MD   MAGNESIUM PO Take 400 mg by mouth daily    Historical Provider, MD   timolol (TIMOPTIC) 0.5 % ophthalmic solution Place 1 drop into both eyes 2 times daily     Historical Provider, MD   warfarin (COUMADIN) 2 MG tablet Take 2 mg by mouth daily Indications: managed by Dr. Jose Cruz Pimentel  9/16/21   Historical Provider, MD   amiodarone (CORDARONE) 200 MG tablet Take 200 mg by mouth daily  8/29/21   Historical Provider, MD   aspirin 81 MG EC tablet Take 81 mg by mouth daily  8/17/21   Historical Provider, MD   carvedilol (COREG) 6.25 MG tablet Take 6.25 mg by mouth 2 times daily (with meals)  7/29/21   Historical Provider, MD   ferrous sulfate (IRON 325) 325 (65 Fe) MG tablet Take 325 mg by mouth daily (with breakfast)  8/17/21   Historical Provider, MD   potassium chloride (KLOR-CON M) 20 MEQ extended release tablet Take 20 mEq by mouth daily  Patient not taking: Reported on 9/27/2021    Historical Provider, MD   metFORMIN (GLUCOPHAGE) 500 MG tablet Take 500 mg by mouth 2 times daily (with meals)    Historical Provider, MD

## 2021-10-02 NOTE — ED PROVIDER NOTES
(36.2 °C) Oral 75 16 99 % 5' 1\" (1.549 m) 115 lb (52.2 kg)     Physical Exam  Vitals reviewed. Constitutional:       General: She is not in acute distress. Appearance: She is well-developed. She is not diaphoretic. HENT:      Head: No raccoon eyes or Butler's sign. Eyes:      General: No scleral icterus. Conjunctiva/sclera: Conjunctivae normal.   Neck:      Vascular: No JVD. Cardiovascular:      Rate and Rhythm: Normal rate. Pulses: Normal pulses. Pulmonary:      Effort: Pulmonary effort is normal. No respiratory distress. Breath sounds: No stridor. Musculoskeletal:      Cervical back: No swelling, deformity, tenderness or bony tenderness. Thoracic back: Tenderness present. No swelling, deformity or bony tenderness. Lumbar back: Tenderness and bony tenderness present. No swelling or deformity. Right lower leg: No edema. Left lower leg: No edema. Comments: Moves extremities. Skin:     General: Skin is warm and dry. Capillary Refill: Capillary refill takes less than 2 seconds. Findings: No rash. Neurological:      Mental Status: She is alert and oriented to person, place, and time. GCS: GCS eye subscore is 4. GCS verbal subscore is 5. GCS motor subscore is 6. Cranial Nerves: Cranial nerves are intact. Motor: Motor function is intact. Coordination: Coordination is intact.    Psychiatric:         Behavior: Behavior normal.         DIAGNOSTIC RESULTS     RADIOLOGY:   Non-plain film images such as CT, Ultrasound and MRI are read by the radiologist. Plain radiographic images are visualized and preliminarily interpreted by the emergency physician with the below findings:    Interpretation per the Radiologist below, if available at the time of this note:    CT THORACIC SPINE WO CONTRAST    Result Date: 10/2/2021  EXAMINATION: CT OF THE THORACIC SPINE WITHOUT CONTRAST  10/2/2021 3:43 pm: TECHNIQUE: CT of the thoracic spine was performed without the administration of intravenous contrast. Multiplanar reformatted images are provided for review. Dose modulation, iterative reconstruction, and/or weight based adjustment of the mA/kV was utilized to reduce the radiation dose to as low as reasonably achievable. COMPARISON: None. HISTORY: ORDERING SYSTEM PROVIDED HISTORY: pain TECHNOLOGIST PROVIDED HISTORY: pain Reason for Exam: Falls, lower back pain Acuity: Acute Type of Exam: Initial FINDINGS: BONES/ALIGNMENT: There is normal alignment of the spine. The vertebral body heights are maintained. No osseous destructive lesion is seen. DEGENERATIVE CHANGES: No gross spinal canal stenosis or bony neural foraminal narrowing of the thoracic spine. SOFT TISSUES: No paraspinal mass is seen. Moderate left pleural effusion. Moderate left pleural effusion otherwise unremarkable CT of the thoracic spine. CT LUMBAR SPINE WO CONTRAST    Result Date: 10/2/2021  EXAMINATION: CT OF THE LUMBAR SPINE WITHOUT CONTRAST  10/2/2021 TECHNIQUE: CT of the lumbar spine was performed without the administration of intravenous contrast. Multiplanar reformatted images are provided for review. Adjustment of mA and/or kV according to patient size was utilized. Dose modulation, iterative reconstruction, and/or weight based adjustment of the mA/kV was utilized to reduce the radiation dose to as low as reasonably achievable. COMPARISON: September 6, 2021 radiograph lumbar spine HISTORY: ORDERING SYSTEM PROVIDED HISTORY: pain TECHNOLOGIST PROVIDED HISTORY: pain Decision Support Exception - unselect if not a suspected or confirmed emergency medical condition->Emergency Medical Condition (MA) FINDINGS: BONES/ALIGNMENT: Diffuse demineralization. Compression fractures T12, L1, and L4. Slight retropulsion at L1. Slight retrolisthesis at L3-4. DEGENERATIVE CHANGES: Multilevel vacuum disc phenomena. Neural foraminal narrowing bilaterally at L3-4.  SOFT TISSUES/RETROPERITONEUM: No paraspinal mass is seen. Large right renal cyst.     New vertebroplasty L1. Multiple compression fractures as above, otherwise unchanged. LABS:  Labs Reviewed   BASIC METABOLIC PANEL - Abnormal; Notable for the following components:       Result Value    Glucose 155 (*)     BUN 35 (*)     CREATININE 2.00 (*)     Potassium 3.3 (*)     Chloride 87 (*)     CO2 34 (*)     GFR Non- 24 (*)     GFR  29 (*)     All other components within normal limits   CBC WITH AUTO DIFFERENTIAL - Abnormal; Notable for the following components:    RBC 3.43 (*)     Hemoglobin 10.4 (*)     Hematocrit 31.0 (*)     Seg Neutrophils 73 (*)     Lymphocytes 12 (*)     Immature Granulocytes 1 (*)     Absolute Lymph # 0.97 (*)     All other components within normal limits   URINALYSIS WITH MICROSCOPIC       All other labs were within normal range or not returned as of this dictation. EMERGENCY DEPARTMENT COURSE and DIFFERENTIAL DIAGNOSIS/MDM:   Vitals:    Vitals:    10/02/21 1517 10/02/21 1519   Pulse:  75   Resp:  16   Temp: 97.2 °F (36.2 °C)    TempSrc: Oral    SpO2:  99%   Weight: 115 lb (52.2 kg)    Height: 5' 1\" (1.549 m)          MEDICATIONS GIVEN IN THE ED:  Medications   acetaminophen (TYLENOL) tablet 650 mg (has no administration in time range)       CLINICAL DECISION MAKING:  The patient presented alert with a nontoxic appearance and was seen in conjunction with Dr. Phillip Acevedo. Laboratory studies were unremarkable when compared to baseline. Imaging findings were discussed with the patient and her family. Admission to the hospital was offered. Family preferred to take the patient home. The patient was also in agreement to go home. Follow up with pcp for a recheck, further evaluation and treatment. Evaluation and treatment course in the ED, and plan of care upon discharge was discussed in length with the patient.   Patient had no further questions prior to being discharged and was instructed to return to the ED for new or worsening symptoms. FINAL IMPRESSION      1. Low back pain without sciatica, unspecified back pain laterality, unspecified chronicity    2.  History of vertebral compression fracture            Problem List  Patient Active Problem List   Diagnosis Code    Acute on chronic diastolic CHF (congestive heart failure) ([facility]) I50.33    Pathological fracture of lumbar vertebra due to secondary osteoporosis ([facility] [address] M80.88XA    Intractable back pain M54.9    Age-related osteoporosis with current pathological fracture M80.00XA    Bilateral primary osteoarthritis of hip M16.0    DDD (degenerative disc disease), lumbar M51.36         DISPOSITION/PLAN   DISPOSITION Decision To Discharge 10/02/2021 04:51:27 PM      PATIENT REFERRED TO:   Faheem Plasencia MD  [address] [place] [id number] [phone]    Schedule an appointment as soon as possible for a visit       [facility] ED  [address] [facility]  [phone]    If symptoms worsen, As needed      DISCHARGE MEDICATIONS:     New Prescriptions    No medications on file           (Please note that portions of this note were completed with a voice recognition program.  Efforts were made to edit the dictations but occasionally words are mis-transcribed.)    AMBROCIO Guerrero CNP, APRN - CNP  10/03/21 1023

## 2021-10-02 NOTE — ED PROVIDER NOTES
EMERGENCY DEPARTMENT ENCOUNTER   ATTENDING ATTESTATION     Pt Name: Krista Acosta  MRN: [id number]  [place] 1940  Date of evaluation: 10/2/21   Krista Acosta is a 78 y.o. female with CC: Back Pain (\"legs gave out\")    MDM:   The patient is a 70-year-old female whp presented to the emergency department secondary to back pain, known history of compression fractures. Compression fractures noted again on imaging. Patient is accompanied by family. They felt comfortable patient being discharged back home she presented to the emergency department yesterday. Discussed with family admission for possible placement in rehab however they declined. Given this patient's discharged home with continued outpatient follow-up and parameters to return to the emergency department. CRITICAL CARE:       EKG: All EKG's are interpreted by the Emergency Department Physician who either signs or Co-signs this chart in the absence of a cardiologist.      RADIOLOGY:All plain film, CT, MRI, and formal ultrasound images (except ED bedside ultrasound) are read by the radiologist, see reports below, unless otherwise noted in MDM or here. CT LUMBAR SPINE WO CONTRAST   Final Result   New vertebroplasty L1. Multiple compression fractures as above, otherwise   unchanged. CT THORACIC SPINE WO CONTRAST   Final Result   Moderate left pleural effusion otherwise unremarkable CT of the thoracic   spine. LABS: All lab results were reviewed by myself, and all abnormals are listed below.   Labs Reviewed   BASIC METABOLIC PANEL - Abnormal; Notable for the following components:       Result Value    Glucose 155 (*)     BUN 35 (*)     CREATININE 2.00 (*)     Potassium 3.3 (*)     Chloride 87 (*)     CO2 34 (*)     GFR Non- 24 (*)     GFR  29 (*)     All other components within normal limits   CBC WITH AUTO DIFFERENTIAL - Abnormal; Notable for the following components:    RBC 3.43 (*)     Hemoglobin 10.4 (*)     Hematocrit 31.0 (*)     Seg Neutrophils 73 (*)     Lymphocytes 12 (*)     Immature Granulocytes 1 (*)     Absolute Lymph # 0.97 (*)     All other components within normal limits   URINALYSIS WITH MICROSCOPIC     CONSULTS:  None  FINAL IMPRESSION      1. Low back pain without sciatica, unspecified back pain laterality, unspecified chronicity    2.  History of vertebral compression fracture            PASTMEDICAL HISTORY     Past Medical History:   Diagnosis Date    Diabetes mellitus ([facility] [address]     Hypertension     Tachycardia      SURGICAL HISTORY       Past Surgical History:   Procedure Laterality Date    ABDOMEN SURGERY      BREAST SURGERY      HYSTERECTOMY      SPINE SURGERY N/A 9/21/2021    L1 KYPHOPLASTY WITH BIOPSY CARMS performed by David Heart MD at [facility]       Previous Medications    AMIODARONE (CORDARONE) 200 MG TABLET    TAKE 1 TABLET BY MOUTH EVERY DAY    ASPIRIN 81 MG EC TABLET    Take 81 mg by mouth daily     CARVEDILOL (COREG) 6.25 MG TABLET    TAKE 1 TABLET BY MOUTH TWICE A DAY WITH FOOD    CYCLOBENZAPRINE (FLEXERIL) 10 MG TABLET    Take 1 tablet by mouth 3 times daily as needed for Muscle spasms    FERROUS SULFATE (IRON 325) 325 (65 FE) MG TABLET    Take 325 mg by mouth daily (with breakfast)     LEVOTHYROXINE (SYNTHROID) 25 MCG TABLET    Take 25 mcg by mouth daily    MAGNESIUM PO    Take 400 mg by mouth daily    METFORMIN (GLUCOPHAGE) 500 MG TABLET    Take 500 mg by mouth 2 times daily (with meals)    MIRABEGRON (MYRBETRIQ) 25 MG TB24    Take 25 mg by mouth daily    POTASSIUM CHLORIDE (KLOR-CON M) 20 MEQ EXTENDED RELEASE TABLET    Take 20 mEq by mouth daily    TIMOLOL (TIMOPTIC) 0.5 % OPHTHALMIC SOLUTION    Place 1 drop into both eyes 2 times daily     TORSEMIDE (DEMADEX) 20 MG TABLET    Take 2 tablets by mouth daily    WARFARIN (COUMADIN) 2 MG TABLET    TAKE 1 TABLET BY MOUTH EVERY DAY     ALLERGIES     is allergic to ambien [zolpidem] and bactrim [sulfamethoxazole-trimethoprim]. FAMILY HISTORY     has no family status information on file. SOCIAL HISTORY       Social History     Tobacco Use    Smoking status: Never Smoker    Smokeless tobacco: Never Used   Substance Use Topics    Alcohol use: Never    Drug use: Never       I personally evaluated and examined the patient in conjunction with the APC and agree with the assessment, treatment plan, and disposition of the patient as recorded by the APC.    Virgilio Cuenca MD  Attending Emergency Physician          Virgilio Cuenca MD  02/26/76 [id number]

## 2021-10-02 NOTE — ED NOTES
Bed: 10  Expected date: 10/2/21  Expected time: 3:04 PM  Means of arrival: [address]  Comments:  [facility] [address] [facility].  Briana Hester, [address] [place] [address]  10/02/21 9781

## 2021-10-02 NOTE — ED PROVIDER NOTES
[address] [place] [address] ED  eMERGENCY dEPARTMENT eNCOUnter      Pt Name: Antonieta Garcia  MRN: [id number]  [place] 1940  Date of evaluation: 10/1/2021  Provider: Ehsan Bauman NP, [facility] - [id number]       Chief Complaint   Patient presents with    Chest Pain    Shortness of Breath         HISTORY OF PRESENT ILLNESS  (Location/Symptom, Timing/Onset, Context/Setting, Quality, Duration, Modifying Factors, Severity.)   Antonieta Garcia is a 78 y.o. female who presents to the emergency department by private vehicle for evaluation of shortness of breath in addition to some back pain and generalized weakness. The patient reports that she has a history of CHF. She states that she has had some shortness of breath. She also has had some back pain but she has a recent compression fracture of her breath with kyphoplasty. She also complains of some generalized weakness and fatigue and states that she just does not feel well. Nursing Notes were reviewed.     ALLERGIES     Ambien [zolpidem] and Bactrim [sulfamethoxazole-trimethoprim]    CURRENT MEDICATIONS       Discharge Medication List as of 10/1/2021  3:46 PM      CONTINUE these medications which have NOT CHANGED    Details   torsemide (DEMADEX) 20 MG tablet Take 2 tablets by mouth daily, Disp-30 tablet, R-0Normal      cyclobenzaprine (FLEXERIL) 10 MG tablet Take 1 tablet by mouth 3 times daily as needed for Muscle spasms, Disp-30 tablet, R-0Normal      levothyroxine (SYNTHROID) 25 MCG tablet Take 25 mcg by mouth dailyHistorical Med      MAGNESIUM PO Take 400 mg by mouth dailyHistorical Med      timolol (TIMOPTIC) 0.5 % ophthalmic solution Place 1 drop into both eyes 2 times daily Historical Med      warfarin (COUMADIN) 2 MG tablet TAKE 1 TABLET BY MOUTH EVERY DAYHistorical Med      amiodarone (CORDARONE) 200 MG tablet TAKE 1 TABLET BY MOUTH EVERY DAYHistorical Med      aspirin 81 MG EC tablet Take 81 mg by mouth daily Historical Med      carvedilol (COREG) 6.25 MG tablet TAKE 1 TABLET BY MOUTH TWICE A DAY WITH FOODHistorical Med      ferrous sulfate (IRON 325) 325 (65 Fe) MG tablet Take 325 mg by mouth daily (with breakfast) Historical Med      potassium chloride (KLOR-CON M) 20 MEQ extended release tablet Take 20 mEq by mouth dailyHistorical Med      metFORMIN (GLUCOPHAGE) 500 MG tablet Take 500 mg by mouth 2 times daily (with meals)Historical Med      mirabegron (MYRBETRIQ) 25 MG TB24 Take 25 mg by mouth dailyHistorical Med             PAST MEDICAL HISTORY         Diagnosis Date    Diabetes mellitus ([address]     Hypertension     Tachycardia        SURGICAL HISTORY           Procedure Laterality Date    ABDOMEN SURGERY      BREAST SURGERY      HYSTERECTOMY      SPINE SURGERY N/A 9/21/2021    L1 KYPHOPLASTY WITH BIOPSY CARMS performed by Constantine Grover MD at [place] [address]     History reviewed. No pertinent family history. No family status information on file. SOCIAL HISTORY      reports that she has never smoked. She has never used smokeless tobacco. She reports that she does not drink alcohol and does not use drugs. REVIEW OF SYSTEMS    (2-9 systems for level 4, 10 or more for level 5)     Review of Systems   Constitutional: Negative for chills, fever and unexpected weight change. HENT: Negative for congestion, rhinorrhea, sinus pressure and sore throat. Respiratory: Positive for shortness of breath. Negative for cough and wheezing. Cardiovascular: Negative for chest pain and palpitations. Gastrointestinal: Negative for abdominal pain, constipation, diarrhea, nausea and vomiting. Genitourinary: Negative for dysuria and hematuria. Musculoskeletal: Positive for back pain. Negative for arthralgias and myalgias. Skin: Negative for color change and rash. Neurological: Positive for weakness. Negative for dizziness and headaches. Hematological: Negative for adenopathy. All other systems reviewed and are negative.     Except as noted above the remainder of the review of systems was reviewed and negative. PHYSICAL EXAM    (up to 7 for level 4, 8 or more for level 5)     ED Triage Vitals [10/01/21 1135]   BP Temp Temp Source Pulse Resp SpO2 Height Weight   (!) 185/68 98.1 °F (36.7 °C) Oral 83 18 98 % 5' 1.5\" (1.562 m) 115 lb (52.2 kg)       Physical Exam  Vitals reviewed. Constitutional:       Appearance: She is well-developed. HENT:      Head: Normocephalic and atraumatic. Eyes:      Conjunctiva/sclera: Conjunctivae normal.      Pupils: Pupils are equal, round, and reactive to light. Cardiovascular:      Rate and Rhythm: Normal rate and regular rhythm. Pulmonary:      Effort: Pulmonary effort is normal. No respiratory distress. Breath sounds: Normal breath sounds. No stridor. Abdominal:      General: Bowel sounds are normal.      Palpations: Abdomen is soft. Musculoskeletal:         General: Normal range of motion. Cervical back: Normal range of motion and neck supple. Lymphadenopathy:      Cervical: No cervical adenopathy. Skin:     General: Skin is warm and dry. Findings: No rash. Neurological:      Mental Status: She is alert and oriented to person, place, and time. RADIOLOGY:   Non-plain film images such as CT, Ultrasound and MRI are read by the radiologist. Gabriel Dobson radiographic images are visualized and preliminarily interpreted by the emergency physician with the below findings:    XR CHEST (2 VW)    Result Date: 9/22/2021  EXAMINATION: TWO XRAY VIEWS OF THE CHEST 9/22/2021 12:59 pm COMPARISON: Frontal view of the chest September 19, 2021. HISTORY: ORDERING SYSTEM PROVIDED HISTORY: CHF TECHNOLOGIST PROVIDED HISTORY: CHF Reason for Exam: CHF Acuity: Unknown Type of Exam: Unknown Relevant Medical/Surgical History: CHF FINDINGS: Opacification of most of the mid and lower left lung field persists compatible with pleural effusion. The heart is normal in size. Left atrial clip apparent. Sternal sutures and mediastinal clips as well as ring markers compatible with previous CABG and possibly valve surgery. No evidence of pneumothorax. No definite evidence of infiltrate. There has been previous kyphoplasty of what may be L1. The central pulmonary vascularity appears normal.     Persistent left pleural effusion. No definite evidence to suggest infiltrate at this time. XR THORACIC SPINE (3 VIEWS)    Result Date: 9/6/2021  EXAMINATION: THREE XRAY VIEWS OF THE THORACIC SPINE; THREE XRAY VIEWS OF THE LUMBAR SPINE 9/6/2021 2:19 pm COMPARISON: None. HISTORY: ORDERING SYSTEM PROVIDED HISTORY: Pain TECHNOLOGIST PROVIDED HISTORY: Pain Reason for Exam: back pain Acuity: Acute Type of Exam: Initial Mechanism of Injury: fell; ORDERING SYSTEM PROVIDED HISTORY: back pain TECHNOLOGIST PROVIDED HISTORY: back pain FINDINGS: No subluxations are seen. Compression fracture of L1 vertebral body with 75% loss of normal height. No significant retropulsion into the spinal canal. Mild superior endplate depression of L4 vertebral body. There is disc space narrowing with eburnation of the vertebral endplates in the midthoracic spine and also at the L1-2, L2-3, L3-4, L4-5 levels. There is sclerosis of the facet joints in the mid and lower lumbar spine. The posterior elements are otherwise intact. The paraspinal soft tissues are unremarkable except for atherosclerotic changes. Postsurgical changes overlying the mediastinum. Left pleural effusion and possible left basal infiltrate. Compression fracture of L1 vertebral body with 75% loss of normal height. Mild superior endplate depression of L4 vertebral body. Multilevel spondylosis and degenerative disc disease. Facet arthropathy Left pleural effusion and possible left basal infiltrate. XR LUMBAR SPINE (2-3 VIEWS)    Result Date: 9/6/2021  EXAMINATION: THREE XRAY VIEWS OF THE THORACIC SPINE; THREE XRAY VIEWS OF THE LUMBAR SPINE 9/6/2021 2:19 pm COMPARISON: None. HISTORY: ORDERING SYSTEM PROVIDED HISTORY: Pain TECHNOLOGIST PROVIDED HISTORY: Pain Reason for Exam: back pain Acuity: Acute Type of Exam: Initial Mechanism of Injury: fell; ORDERING SYSTEM PROVIDED HISTORY: back pain TECHNOLOGIST PROVIDED HISTORY: back pain FINDINGS: No subluxations are seen. Compression fracture of L1 vertebral body with 75% loss of normal height. No significant retropulsion into the spinal canal. Mild superior endplate depression of L4 vertebral body. There is disc space narrowing with eburnation of the vertebral endplates in the midthoracic spine and also at the L1-2, L2-3, L3-4, L4-5 levels. There is sclerosis of the facet joints in the mid and lower lumbar spine. The posterior elements are otherwise intact. The paraspinal soft tissues are unremarkable except for atherosclerotic changes. Postsurgical changes overlying the mediastinum. Left pleural effusion and possible left basal infiltrate. Compression fracture of L1 vertebral body with 75% loss of normal height. Mild superior endplate depression of L4 vertebral body. Multilevel spondylosis and degenerative disc disease. Facet arthropathy Left pleural effusion and possible left basal infiltrate. XR HIP BILATERAL W AP PELVIS (2 VIEWS)    Result Date: 9/6/2021  EXAMINATION: ONE XRAY VIEW OF THE PELVIS AND TWO XRAY VIEWS OF EACH OF THE BILATERAL HIPS 9/6/2021 2:19 pm COMPARISON: None. HISTORY: ORDERING SYSTEM PROVIDED HISTORY: fall TECHNOLOGIST PROVIDED HISTORY: fall Reason for Exam: bilateral hip pain Acuity: Acute Type of Exam: Initial Mechanism of Injury: fell FINDINGS: The visualized bones are osteopenic. There is no evidence of fracture or dislocation. Mild to moderate degenerative changes of the bilateral hips. The remaining joint spaces appear well maintained. The soft tissues are unremarkable. Vascular calcifications are seen compatible with atherosclerotic disease.      No acute bony abnormalities are noted Osteoarthritis of the bilateral hips     NM LUNG SCAN PERFUSION ONLY    Result Date: 10/1/2021  EXAMINATION: NUCLEAR MEDICINE PERFUSION SCAN. 10/1/2021 TECHNIQUE: Ventilation not performed as part of COVID-19 safety precautions. 6.8 millicuries of Tc 50C MAA was administered intravenously prior to planar imaging of the lungs in multiple projections. COMPARISON: Chest radiograph from 10/01/2021 HISTORY: ORDERING SYSTEM PROVIDED HISTORY: SOB TECHNOLOGIST PROVIDED HISTORY: SOB Decision Support Exception - unselect if not a suspected or confirmed emergency medical condition->Emergency Medical Condition (MA) Reason for Exam: sob Acuity: Unknown Type of Exam: Unknown 49-year-old female with shortness of breath FINDINGS: PERFUSION: Large area of diminished perfusion involving the left lung base likely corresponding to the left basilar airspace disease and small left-sided pleural effusion. Distribution of radiotracer is otherwise homogeneous. No other segmental defects evident. CHEST RADIOGRAPH: Left basilar airspace disease and small left-sided pleural effusion. Low Probability V/Q scan for Pulmonary Embolus. XR CHEST PORTABLE    Result Date: 10/1/2021  EXAMINATION: ONE XRAY VIEW OF THE CHEST 10/1/2021 11:50 am COMPARISON: AP chest from 09/23/2021 HISTORY: ORDERING SYSTEM PROVIDED HISTORY: Chest Pain TECHNOLOGIST PROVIDED HISTORY: Chest Pain Reason for Exam: Pt c/o chest pain, lower back pain. AP UPRIGHT PORTABLE Acuity: Acute Type of Exam: Initial History of left pleural effusion FINDINGS: Overlying ECG monitor leads. Midline sternotomy wires, clips and atrial appendage clip. Post kyphoplasty changes upper lumbar spine. Stable or slightly smaller left pleural effusion. Some cephalization of blood flow and prominence interstitial markings, similar by comparison. No new pulmonary finding or right pleural effusion. Cardiomediastinal shadow and bony structures stable. Stable or slightly smaller left pleural effusion.      XR CHEST PORTABLE    Result Date: 9/23/2021  EXAMINATION: ONE XRAY VIEW OF THE CHEST 9/23/2021 4:38 am COMPARISON: September 22, 2021 HISTORY: ORDERING SYSTEM PROVIDED HISTORY: Effusion TECHNOLOGIST PROVIDED HISTORY: Effusion Reason for Exam: Effusion Acuity: Unknown Type of Exam: Unknown Relevant Medical/Surgical History: Effusion FINDINGS: Stable moderate left pleural effusion. No pneumothorax. No focal lung abnormality or lung consolidation in the aerated lungs. Stable cardiomediastinal silhouette. No significant pulmonary edema. Stable small-moderate left pleural effusion. XR CHEST PORTABLE    Result Date: 9/19/2021  EXAMINATION: ONE XRAY VIEW OF THE CHEST 9/19/2021 10:12 pm COMPARISON: None. HISTORY: ORDERING SYSTEM PROVIDED HISTORY: CP/SOB TECHNOLOGIST PROVIDED HISTORY: CP/SOB Reason for Exam: chest pain Acuity: Unknown Type of Exam: Unknown FINDINGS: Right lung overall clear. There is moderate left-sided pleural effusion and or infiltrate at the left base. This is similar 2 thoracic spine imaging September 6, 2021. Cardiac size stable. Loop recorder sternotomy wire sutures noted. Mediastinum unremarkable. Osseous structures grossly intact. Telemetry leads overlie the chest.     There is a moderate size left pleural effusion with probable infiltrate at the left base. This appears stable compared to a partial visualization on thoracic spine imaging September 6, 2021. FLUORO FOR SURGICAL PROCEDURES    Result Date: 9/21/2021  Radiology exam is complete. No Radiologist dictation. Please follow up with ordering provider. Interpretation per the Radiologist below, if available at the time of this note:    NM LUNG SCAN PERFUSION ONLY   Final Result   Low Probability V/Q scan for Pulmonary Embolus. XR CHEST PORTABLE   Final Result   Stable or slightly smaller left pleural effusion.                  LABS:  Labs Reviewed   URINE RT REFLEX TO CULTURE - Abnormal; Notable for the following components:       Result Value    Urine Hgb TRACE (*)     All other components within normal limits   CBC WITH AUTO DIFFERENTIAL - Abnormal; Notable for the following components:    RBC 3.34 (*)     Hemoglobin 10.1 (*)     Hematocrit 30.3 (*)     Seg Neutrophils 71 (*)     Lymphocytes 13 (*)     Eosinophils % 5 (*)     Immature Granulocytes 1 (*)     Absolute Lymph # 0.76 (*)     All other components within normal limits   BASIC METABOLIC PANEL - Abnormal; Notable for the following components:    Glucose 155 (*)     BUN 36 (*)     CREATININE 1.92 (*)     Potassium 3.2 (*)     Chloride 87 (*)     Anion Gap 18 (*)     GFR Non- 25 (*)     GFR  30 (*)     All other components within normal limits   TSH WITH REFLEX - Abnormal; Notable for the following components:    TSH 6.62 (*)     All other components within normal limits   TROP/MYOGLOBIN - Abnormal; Notable for the following components:    Troponin, High Sensitivity 28 (*)     All other components within normal limits   BRAIN NATRIURETIC PEPTIDE - Abnormal; Notable for the following components:    Pro-BNP 1,578 (*)     All other components within normal limits   MICROSCOPIC URINALYSIS   T4, FREE       All other labs were within normal range or not returned as of this dictation. EMERGENCY DEPARTMENT COURSE and DIFFERENTIAL DIAGNOSIS/MDM:   Vitals:    Vitals:    10/01/21 1135 10/01/21 1404   BP: (!) 185/68 (!) 154/64   Pulse: 83 83   Resp: 18    Temp: 98.1 °F (36.7 °C)    TempSrc: Oral    SpO2: 98% 97%   Weight: 115 lb (52.2 kg)    Height: 5' 1.5\" (1.562 m)        Medical Decision Making: Laboratory studies and imaging are unremarkable. The patient is able to ambulate to the bathroom and back without any difficulty. Her labs reveal a slightly elevated creatinine but not much above her baseline. The patient was told to make sure she drinks fluids. Follow-up with her primary care physician. FINAL IMPRESSION      1.  Generalized weakness    2.  Hypokalemia          DISPOSITION/PLAN   DISPOSITION Decision To Discharge 10/01/2021 03:44:11 PM      PATIENT REFERRED TO:   Gerhard Luis MD  [address] [place] [address]  [phone]    Call       [facility] ED  [address] [facility]  [phone]    If symptoms worsen      DISCHARGE MEDICATIONS:     Discharge Medication List as of 10/1/2021  3:46 PM              (Please note that portions of this note were completed with a voice recognition program.  Efforts were made to edit the dictations but occasionally words are mis-transcribed.)    Lucia Huerta NP, APRN - [place]  Certified Nurse Practitioner        AMBROCIO Aquino - CNP  10/01/21 [id number]

## 2021-10-03 ASSESSMENT — ENCOUNTER SYMPTOMS
NAUSEA: 0
VOMITING: 0
EYE PAIN: 0
ABDOMINAL PAIN: 0
BACK PAIN: 1
COUGH: 0
COLOR CHANGE: 0
SHORTNESS OF BREATH: 0
PHOTOPHOBIA: 0
DIARRHEA: 0

## 2021-10-04 ENCOUNTER — TELEPHONE (OUTPATIENT)
Dept: OTHER | Facility: CLINIC | Age: 81
End: 2021-10-04

## 2021-10-04 NOTE — TELEPHONE ENCOUNTER
[facility] attempted to contact Dr. Moiz Hamilton, spoke with office, [facility] stated no Dr. Moiz Hamilton works there. No other listed to provider. Will contact pt.

## (undated) DEVICE — MINOR BSIN PK

## (undated) DEVICE — SYRINGE MED 30ML STD CLR PLAS LUERLOCK TIP N CTRL DISP

## (undated) DEVICE — DEVICE INFLATION W/ SYR KYPHON

## (undated) DEVICE — INTENDED FOR TISSUE SEPARATION, AND OTHER PROCEDURES THAT REQUIRE A SHARP SURGICAL BLADE TO PUNCTURE OR CUT.: Brand: BARD-PARKER ® CARBON RIB-BACK BLADES

## (undated) DEVICE — JCKSON TBL POSTNER NO HD REST: Brand: MEDLINE INDUSTRIES, INC.

## (undated) DEVICE — BONE TAMP KIT KPX153PB FF X2 15/3 1 STP: Brand: KYPHOPAK™ TRAY

## (undated) DEVICE — NEEDLE SPNL 18GA L3.5IN W/ QNCKE SHARPER BVL DURA CLICK

## (undated) DEVICE — SUTURE NONABSORBABLE MONOFILAMENT 3-0 PS-1 18 IN BLK ETHILON 1663H

## (undated) DEVICE — INSERT CUSHION HEAD PRONEVIEW

## (undated) DEVICE — SHEET, T, LAPAROTOMY, STERILE: Brand: MEDLINE

## (undated) DEVICE — DRAPE IRRIG FLD WRM W44XL44IN W/ AORN STD PRTBL INTRATEMP

## (undated) DEVICE — BONE BIOPSY DEVICE F05A BBD SIZE 3: Brand: MEDTRONIC REUSABLE INSTRUMENTS

## (undated) DEVICE — 1010 S-DRAPE TOWEL DRAPE 10/BX: Brand: STERI-DRAPE™

## (undated) DEVICE — GLOVE SURG SZ 8 THK118MIL BLK LTX FREE POLYISOPRENE BEAD CUF

## (undated) DEVICE — APPLICATOR MEDICATED 26 CC SOLUTION HI LT ORNG CHLORAPREP

## (undated) DEVICE — C-ARM: Brand: UNBRANDED

## (undated) DEVICE — PAD,ABDOMINAL,5"X9",ST,LF,25/BX: Brand: MEDLINE INDUSTRIES, INC.

## (undated) DEVICE — SPONGE GZ W4XL4IN RAYON POLY FILL CVR W/ NONWOVEN FAB

## (undated) DEVICE — TOWEL,OR,DSP,ST,BLUE,DLX,XR,4/PK,20PK/CS: Brand: MEDLINE

## (undated) DEVICE — MIXER A07A CEMENT

## (undated) DEVICE — CONTAINER,SPECIMEN,OR STERILE,4OZ: Brand: MEDLINE

## (undated) DEVICE — PAD N ADH W3XL4IN POLY COT SFT PERF FLM EASILY CUT ABSRB